# Patient Record
Sex: FEMALE | Race: WHITE | NOT HISPANIC OR LATINO | Employment: FULL TIME | URBAN - METROPOLITAN AREA
[De-identification: names, ages, dates, MRNs, and addresses within clinical notes are randomized per-mention and may not be internally consistent; named-entity substitution may affect disease eponyms.]

---

## 2016-12-30 RX ORDER — CELECOXIB 100 MG/1
100 CAPSULE ORAL 2 TIMES DAILY PRN
COMMUNITY
End: 2018-03-29 | Stop reason: CLARIF

## 2016-12-30 RX ORDER — MULTIVITAMIN
1 TABLET ORAL DAILY
COMMUNITY

## 2017-01-10 ENCOUNTER — ANESTHESIA (OUTPATIENT)
Dept: PERIOP | Facility: HOSPITAL | Age: 57
End: 2017-01-10
Payer: COMMERCIAL

## 2017-01-10 ENCOUNTER — ANESTHESIA EVENT (OUTPATIENT)
Dept: PERIOP | Facility: HOSPITAL | Age: 57
End: 2017-01-10
Payer: COMMERCIAL

## 2017-01-10 ENCOUNTER — HOSPITAL ENCOUNTER (OUTPATIENT)
Facility: HOSPITAL | Age: 57
Setting detail: OUTPATIENT SURGERY
Discharge: HOME | End: 2017-01-10
Attending: COLON & RECTAL SURGERY | Admitting: COLON & RECTAL SURGERY
Payer: COMMERCIAL

## 2017-01-10 VITALS
RESPIRATION RATE: 20 BRPM | BODY MASS INDEX: 22.2 KG/M2 | HEART RATE: 78 BPM | TEMPERATURE: 100 F | WEIGHT: 130 LBS | SYSTOLIC BLOOD PRESSURE: 129 MMHG | DIASTOLIC BLOOD PRESSURE: 70 MMHG | HEIGHT: 64 IN | OXYGEN SATURATION: 100 %

## 2017-01-10 DIAGNOSIS — K64.8 INTERNAL AND EXTERNAL HEMORRHOIDS WITHOUT COMPLICATION: ICD-10-CM

## 2017-01-10 DIAGNOSIS — K64.4 INTERNAL AND EXTERNAL HEMORRHOIDS WITHOUT COMPLICATION: ICD-10-CM

## 2017-01-10 PROCEDURE — 88304 TISSUE EXAM BY PATHOLOGIST: CPT | Performed by: COLON & RECTAL SURGERY

## 2017-01-10 RX ORDER — FENTANYL CITRATE 50 UG/ML
INJECTION, SOLUTION INTRAMUSCULAR; INTRAVENOUS AS NEEDED
Status: DISCONTINUED | OUTPATIENT
Start: 2017-01-10 | End: 2017-01-10 | Stop reason: SURG

## 2017-01-10 RX ORDER — OXYCODONE HYDROCHLORIDE AND ACETAMINOPHEN 5; 325 MG/1; MG/1
TABLET ORAL
Status: COMPLETED
Start: 2017-01-10 | End: 2017-01-10

## 2017-01-10 RX ORDER — PROPOFOL 10 MG/ML
INJECTION, EMULSION INTRAVENOUS CONTINUOUS PRN
Status: DISCONTINUED | OUTPATIENT
Start: 2017-01-10 | End: 2017-01-10 | Stop reason: SURG

## 2017-01-10 RX ORDER — FENTANYL CITRATE/PF 50 MCG/ML
25 SYRINGE (ML) INJECTION
Status: DISCONTINUED | OUTPATIENT
Start: 2017-01-10 | End: 2017-01-10 | Stop reason: HOSPADM

## 2017-01-10 RX ORDER — BUPIVACAINE HYDROCHLORIDE AND EPINEPHRINE 2.5; 5 MG/ML; UG/ML
INJECTION, SOLUTION EPIDURAL; INFILTRATION; INTRACAUDAL; PERINEURAL AS NEEDED
Status: DISCONTINUED | OUTPATIENT
Start: 2017-01-10 | End: 2017-01-10 | Stop reason: HOSPADM

## 2017-01-10 RX ORDER — SODIUM CHLORIDE, SODIUM LACTATE, POTASSIUM CHLORIDE, CALCIUM CHLORIDE 600; 310; 30; 20 MG/100ML; MG/100ML; MG/100ML; MG/100ML
20 INJECTION, SOLUTION INTRAVENOUS CONTINUOUS
Status: CANCELLED | OUTPATIENT
Start: 2017-01-10

## 2017-01-10 RX ORDER — PROPOFOL 10 MG/ML
INJECTION, EMULSION INTRAVENOUS AS NEEDED
Status: DISCONTINUED | OUTPATIENT
Start: 2017-01-10 | End: 2017-01-10 | Stop reason: SURG

## 2017-01-10 RX ORDER — ONDANSETRON 2 MG/ML
INJECTION INTRAMUSCULAR; INTRAVENOUS AS NEEDED
Status: DISCONTINUED | OUTPATIENT
Start: 2017-01-10 | End: 2017-01-10 | Stop reason: SURG

## 2017-01-10 RX ORDER — SODIUM CHLORIDE, SODIUM LACTATE, POTASSIUM CHLORIDE, CALCIUM CHLORIDE 600; 310; 30; 20 MG/100ML; MG/100ML; MG/100ML; MG/100ML
INJECTION, SOLUTION INTRAVENOUS CONTINUOUS PRN
Status: DISCONTINUED | OUTPATIENT
Start: 2017-01-10 | End: 2017-01-10 | Stop reason: SURG

## 2017-01-10 RX ORDER — MIDAZOLAM HYDROCHLORIDE 1 MG/ML
INJECTION INTRAMUSCULAR; INTRAVENOUS AS NEEDED
Status: DISCONTINUED | OUTPATIENT
Start: 2017-01-10 | End: 2017-01-10 | Stop reason: SURG

## 2017-01-10 RX ORDER — SODIUM CHLORIDE, SODIUM LACTATE, POTASSIUM CHLORIDE, CALCIUM CHLORIDE 600; 310; 30; 20 MG/100ML; MG/100ML; MG/100ML; MG/100ML
75 INJECTION, SOLUTION INTRAVENOUS CONTINUOUS
Status: DISCONTINUED | OUTPATIENT
Start: 2017-01-10 | End: 2017-01-10 | Stop reason: HOSPADM

## 2017-01-10 RX ORDER — OXYCODONE HYDROCHLORIDE AND ACETAMINOPHEN 5; 325 MG/1; MG/1
1 TABLET ORAL EVERY 4 HOURS PRN
Status: CANCELLED | OUTPATIENT
Start: 2017-01-10

## 2017-01-10 RX ORDER — ONDANSETRON 2 MG/ML
4 INJECTION INTRAMUSCULAR; INTRAVENOUS ONCE AS NEEDED
Status: DISCONTINUED | OUTPATIENT
Start: 2017-01-10 | End: 2017-01-10 | Stop reason: HOSPADM

## 2017-01-10 RX ORDER — OXYCODONE HYDROCHLORIDE AND ACETAMINOPHEN 5; 325 MG/1; MG/1
1 TABLET ORAL EVERY 4 HOURS PRN
Status: DISCONTINUED | OUTPATIENT
Start: 2017-01-10 | End: 2017-01-10 | Stop reason: HOSPADM

## 2017-01-10 RX ADMIN — OXYCODONE HYDROCHLORIDE AND ACETAMINOPHEN 1 TABLET: 5; 325 TABLET ORAL at 11:02

## 2017-01-10 RX ADMIN — PROPOFOL 30 MG: 10 INJECTION, EMULSION INTRAVENOUS at 08:06

## 2017-01-10 RX ADMIN — ONDANSETRON 4 MG: 2 INJECTION INTRAMUSCULAR; INTRAVENOUS at 08:30

## 2017-01-10 RX ADMIN — SODIUM CHLORIDE, SODIUM LACTATE, POTASSIUM CHLORIDE, AND CALCIUM CHLORIDE: .6; .31; .03; .02 INJECTION, SOLUTION INTRAVENOUS at 07:48

## 2017-01-10 RX ADMIN — FENTANYL CITRATE 25 MCG: 50 INJECTION, SOLUTION INTRAMUSCULAR; INTRAVENOUS at 08:10

## 2017-01-10 RX ADMIN — MIDAZOLAM HYDROCHLORIDE 2 MG: 1 INJECTION, SOLUTION INTRAMUSCULAR; INTRAVENOUS at 08:03

## 2017-01-10 RX ADMIN — BISACODYL 10 MG: 5 TABLET, COATED ORAL at 11:41

## 2017-01-10 RX ADMIN — FENTANYL CITRATE 25 MCG: 50 INJECTION, SOLUTION INTRAMUSCULAR; INTRAVENOUS at 08:06

## 2017-01-10 RX ADMIN — PROPOFOL 100 MCG/KG/MIN: 10 INJECTION, EMULSION INTRAVENOUS at 08:06

## 2017-06-08 ENCOUNTER — TRANSCRIBE ORDERS (OUTPATIENT)
Dept: ADMINISTRATIVE | Facility: HOSPITAL | Age: 57
End: 2017-06-08

## 2017-06-08 DIAGNOSIS — Z12.39 SCREENING BREAST EXAMINATION: Primary | ICD-10-CM

## 2017-06-14 DIAGNOSIS — Z12.31 ENCOUNTER FOR SCREENING MAMMOGRAM FOR MALIGNANT NEOPLASM OF BREAST: ICD-10-CM

## 2017-06-15 ENCOUNTER — ALLSCRIPTS OFFICE VISIT (OUTPATIENT)
Dept: OTHER | Facility: OTHER | Age: 57
End: 2017-06-15

## 2017-06-19 ENCOUNTER — HOSPITAL ENCOUNTER (OUTPATIENT)
Dept: MAMMOGRAPHY | Facility: CLINIC | Age: 57
Discharge: HOME | End: 2017-06-19
Payer: COMMERCIAL

## 2017-06-19 DIAGNOSIS — Z12.39 SCREENING BREAST EXAMINATION: ICD-10-CM

## 2017-06-19 PROCEDURE — G0202 SCR MAMMO BI INCL CAD: HCPCS

## 2017-06-22 LAB
ADDITIONAL INFORMATION (HISTORICAL): NORMAL
ADEQUACY: (HISTORICAL): NORMAL
COMMENT (HISTORICAL): NORMAL
CYTOTECHNOLOGIST: (HISTORICAL): NORMAL
HPV MRNA E6/E7 (HISTORICAL): NOT DETECTED
INTERPRETATION (HISTORICAL): NORMAL
LMP (HISTORICAL): NORMAL
PREV. BX: (HISTORICAL): NORMAL
PREV. PAP (HISTORICAL): NORMAL
SOURCE (HISTORICAL): NORMAL

## 2017-07-06 ENCOUNTER — GENERIC CONVERSION - ENCOUNTER (OUTPATIENT)
Dept: OTHER | Facility: OTHER | Age: 57
End: 2017-07-06

## 2018-01-12 NOTE — RESULT NOTES
Verified Results  (Q) THINPREP TIS PAP AND HPV mRNA E6/E7 REFLEX HPV 16,18/45 [id number] 46:61SQ Chris Mcdonald     Test Name Result Flag Reference   CLINICAL INFORMATION:      None given   LMP:      NONE GIVEN   PREV  PAP:      NONE GIVEN   PREV  BX:      NONE GIVEN   SOURCE:      Cervix, Endocervix   STATEMENT OF ADEQUACY:      Satisfactory for evaluation  Endocervical/transformation zone component  present  Age and/or menstrual status not provided   INTERPRETATION/RESULT:      Negative for intraepithelial lesion or malignancy  COMMENT:      This Pap test has been evaluated with computer  assisted technology  CYTOTECHNOLOGIST:      SPS,CT(ASCP)  Ct screening location: [address] [place] [address]   HPV mRNA E6/E7 Not Detected  Not Detected   This test was performed using the APTIMA HPV Assay ([employer] )  This assay detects E6/E7 viral messenger RNA (mRNA) from 14  high-risk HPV types (16,18,31,33,35,39,45,51,52,56,58,59,66,68)

## 2018-01-14 VITALS
DIASTOLIC BLOOD PRESSURE: 78 MMHG | WEIGHT: 132 LBS | SYSTOLIC BLOOD PRESSURE: 128 MMHG | BODY MASS INDEX: 22.53 KG/M2 | HEIGHT: 64 IN

## 2018-03-29 ENCOUNTER — CONSULT (OUTPATIENT)
Dept: VASCULAR SURGERY | Facility: CLINIC | Age: 58
End: 2018-03-29
Payer: COMMERCIAL

## 2018-03-29 VITALS
DIASTOLIC BLOOD PRESSURE: 62 MMHG | RESPIRATION RATE: 16 BRPM | HEIGHT: 64 IN | SYSTOLIC BLOOD PRESSURE: 132 MMHG | WEIGHT: 132 LBS | TEMPERATURE: 98.6 F | BODY MASS INDEX: 22.53 KG/M2 | HEART RATE: 76 BPM

## 2018-03-29 DIAGNOSIS — I78.1 ASYMPTOMATIC SPIDER VEINS OF BOTH LOWER EXTREMITIES: Primary | ICD-10-CM

## 2018-03-29 PROCEDURE — 99244 OFF/OP CNSLTJ NEW/EST MOD 40: CPT | Performed by: NURSE PRACTITIONER

## 2018-03-29 RX ORDER — MOMETASONE FUROATE 50 UG/1
2 SPRAY, METERED NASAL DAILY
COMMUNITY
End: 2022-01-17

## 2018-03-29 NOTE — PROGRESS NOTES
Assessment/Plan:    55 y/o healthy appearing F with MVP and hemorrhoids, seen for evaluation of varicose veins  She has bilateral lower extremity spider veins  She is largely asymptomatic only with mild swelling described as "sock marks" and intermittent heaviness  We discussed possible element of venous insufficiency contributing to her symptoms  She would like to pursue injection sclerotherapy for cosmetic treatment of her veins  We discussed possible side effects to include permanent discoloration and possibility of secondary varicosities  Price is $500 per session; she may require more than 1 session  Patient was seen and examined with Dr Mary Tatum  Diagnoses and all orders for this visit:    Asymptomatic spider veins of both lower extremities  -     Compression Stocking  -     Sclerotherapy; Future    Other orders  -     mometasone (NASONEX) 50 mcg/act nasal spray; 2 sprays into each nostril daily         Patient ID: Belkys Funez is a 56 y o  female  Chief Complaint: Pt is here to eval Vv/spider veins to bilateral legs she has had since before pregnancy in her early 19's  Pt has HX of Vv/ spider veins  This patient is seen for evaluation of varicose veins  She has bilateral lower extremity spider veins  States that veins have been present since her 25s  She is largely asymptomatic, but does experience "sock marks" at the end of the day  No gross swelling or skin changes  She states that sometimes her legs feel heavy; this is improved with elevation  She would like to pursue cosmetic treatment of her veins  The following portions of the patient's history were reviewed and updated as appropriate: allergies, current medications, past family history, past medical history, past social history, past surgical history and problem list     Review of Systems   Constitutional: Negative  HENT: Negative  Eyes: Negative  Respiratory: Negative  Cardiovascular: Negative  Gastrointestinal: Negative  Endocrine: Negative  Genitourinary: Negative  Musculoskeletal: Negative  Skin: Negative  Allergic/Immunologic: Positive for environmental allergies  Neurological: Negative  Hematological: Negative  Psychiatric/Behavioral: Negative  Objective:     Physical Exam   Constitutional: She is oriented to person, place, and time  No distress  Cardiovascular:   Pulses:       Dorsalis pedis pulses are 2+ on the right side, and 2+ on the left side  Posterior tibial pulses are 2+ on the right side, and 2+ on the left side  Spider veins bilateral lower extremities, ankles and thighs   Pulmonary/Chest: Effort normal  No respiratory distress  Musculoskeletal: Normal range of motion  She exhibits no edema  Neurological: She is alert and oriented to person, place, and time  Skin: Skin is warm and dry  Psychiatric: She has a normal mood and affect  Vitals:    03/29/18 1555   BP: 132/62   BP Location: Left arm   Patient Position: Sitting   Cuff Size: Adult   Pulse: 76   Resp: 16   Temp: 98 6 °F (37 °C)   TempSrc: Tympanic   Weight: 59 9 kg (132 lb)   Height: 5' 4" (1 626 m)       There is no problem list on file for this patient  Past Surgical History:   Procedure Laterality Date    CARDIAC CATHETERIZATION      COLONOSCOPY      GANGLION CYST EXCISION Right     wrist    OOPHORECTOMY Right     [place] HEMORRHOIDECTOMY,INT/EXT, 2+ COLUMNS/GROUPS N/A 1/10/2017    Procedure: INTERNAL AND EXTERNAL HEMORRHOIDECTOMY ;  Surgeon: Olivia Love MD;  [facility]: [facility];  Service: Colorectal       History reviewed  No pertinent family history  Social History     Social History    Marital status: /Civil Union     Spouse name: N/A    Number of children: N/A    Years of education: N/A     Occupational History    Not on file       Social History Main Topics    Smoking status: Never Smoker    Smokeless tobacco: Never Used    Alcohol use Yes      Comment: social use    Drug use: No    Sexual activity: Not on file     Other Topics Concern    Not on file     Social History Narrative    No narrative on file       No Known Allergies      Current Outpatient Prescriptions:     mometasone (NASONEX) 50 mcg/act nasal spray, 2 sprays into each nostril daily, Disp: , Rfl:     Multiple Vitamin (MULTIVITAMIN) tablet, Take 1 tablet by mouth daily, Disp: , Rfl:     Multiple Vitamins-Minerals (HAIR/SKIN/NAILS) TABS, Take by mouth daily, Disp: , Rfl:

## 2018-03-29 NOTE — PATIENT INSTRUCTIONS
Spider veins  -You will have injection sclerotherapy by Dr Hermilo Singletary for cosmetic treatment of your varicose veins  -Bring knee high compression stockings with you to your appointment (Rx given)  -Possible side effects of injections include permanent discoloration    There is also the possibility of recurrent varicose/spider veins  -Wearing knee high compression stockings on a daily basis will help manage your swelling and intermittent heaviness

## 2018-05-16 ENCOUNTER — CLINICAL SUPPORT (OUTPATIENT)
Dept: VASCULAR SURGERY | Facility: CLINIC | Age: 58
End: 2018-05-16

## 2018-05-16 DIAGNOSIS — I83.93 SPIDER VEINS OF BOTH LOWER EXTREMITIES: Primary | ICD-10-CM

## 2018-05-16 PROCEDURE — 36468 NJX SCLRSNT SPIDER VEINS: CPT | Performed by: SURGERY

## 2018-05-16 NOTE — PATIENT INSTRUCTIONS
1) Spider vein injections  -please keep your bandages on for the rest of the day and overnight    You can remove then tomorrow and take a shower  -you have no activity restrictions  -you should expect a lot of redness initially that will fade with time  -if you have any signs of infection (fevers, chills, drainage from injection sites, significant pain), please call the office  -I will see you back in 6 wks to look at the results and decide if any additional treatments are needed  -please call the office if you have any questions

## 2018-05-16 NOTE — PROGRESS NOTES
Assessment/Plan:    Pt is a 55 yo F, otherwise healthy, who presents for spider vein injections    Spider veins of both lower extremities  -discussed risks and benefits of procedure; all questions answered  -s/p asclera injections of B legs, 8cc 0 5% solution used in total   No complications  Tolerated very well  -f/u in 6 wks for followup; may require touchup injections    Subjective:      Patient ID: Lauren Frost is a 56 y o  female  HPI:    Presents for first spider vein injections  No prior injection procedures  No DVT  No edema, no varicose veins  The following portions of the patient's history were reviewed and updated as appropriate: allergies, current medications, past family history, past medical history, past social history, past surgical history and problem list     Review of Systems   Constitutional: Negative for chills and fever  Cardiovascular: Negative for leg swelling  Skin: Negative for rash and wound  Objective: There were no vitals taken for this visit  Physical Exam   Skin:   Very small spider veins diffusely over legs, focused on R anterior thigh, B lateral and medial thighs, R medial leg, L medial ankle; B posterior prox thighs, R posterior knee                     There were no vitals filed for this visit  Patient Active Problem List   Diagnosis    Spider veins of both lower extremities       Past Surgical History:   Procedure Laterality Date    CARDIAC CATHETERIZATION      COLONOSCOPY      GANGLION CYST EXCISION Right     wrist    OOPHORECTOMY Right     [place] HEMORRHOIDECTOMY,INT/EXT, 2+ COLUMNS/GROUPS N/A 1/10/2017    Procedure: INTERNAL AND EXTERNAL HEMORRHOIDECTOMY ;  Surgeon: Michael Pearce MD;  [facility];  Service: Colorectal       No family history on file      Social History     Social History    Marital status: /Civil Union     Spouse name: N/A    Number of children: N/A    Years of education: N/A Occupational History    Not on file       Social History Main Topics    Smoking status: Never Smoker    Smokeless tobacco: Never Used    Alcohol use Yes      Comment: social use    Drug use: No    Sexual activity: Not on file     Other Topics Concern    Not on file     Social History Narrative    No narrative on file       No Known Allergies      Current Outpatient Prescriptions:     mometasone (NASONEX) 50 mcg/act nasal spray, 2 sprays into each nostril daily, Disp: , Rfl:     Multiple Vitamin (MULTIVITAMIN) tablet, Take 1 tablet by mouth daily, Disp: , Rfl:     Multiple Vitamins-Minerals (HAIR/SKIN/NAILS) TABS, Take by mouth daily, Disp: , Rfl:

## 2018-07-11 ENCOUNTER — OFFICE VISIT (OUTPATIENT)
Dept: VASCULAR SURGERY | Facility: CLINIC | Age: 58
End: 2018-07-11

## 2018-07-11 VITALS
TEMPERATURE: 98.8 F | RESPIRATION RATE: 16 BRPM | HEIGHT: 64 IN | BODY MASS INDEX: 23.39 KG/M2 | HEART RATE: 78 BPM | WEIGHT: 137 LBS | DIASTOLIC BLOOD PRESSURE: 78 MMHG | SYSTOLIC BLOOD PRESSURE: 118 MMHG

## 2018-07-11 DIAGNOSIS — I83.93 SPIDER VEINS OF BOTH LOWER EXTREMITIES: Primary | ICD-10-CM

## 2018-07-11 PROCEDURE — 99024 POSTOP FOLLOW-UP VISIT: CPT | Performed by: SURGERY

## 2018-07-11 NOTE — PROGRESS NOTES
Assessment/Plan:    Pt is a 57 yo F, otherwise healthy, who presents for recheck after spider vein injections     Spider veins of both lower extremities  -s/p asclera injections of B legs, 8cc 0 5% solution used in total   No complications  Tolerated very well  -excellent results  -one small area anterior R ankle that could use reinjection if desired  -f/u PRN; if wants touchup session, can book without another office visit; 1 vial, 45min appt, free (only 4 vials used for initial treatment)    Subjective:      Patient ID: Milad Snow is a 56 y o  female  Patient is seen for f/u sclerosing injection in the bilat legs  She is overall happy with the appearance since the injections  She has a few areas she is not sure if need to be repeated  HPI:     Presents for followup after spider vein injections      No prior injection procedures  No DVT  No edema, no varicose veins  No issues after injection  Excellent results        The following portions of the patient's history were reviewed and updated as appropriate: allergies, current medications, past family history, past medical history, past social history, past surgical history and problem list     Review of Systems   Constitutional: Negative  HENT: Negative  Eyes: Negative  Respiratory: Negative  Cardiovascular: Negative  Gastrointestinal: Negative  Endocrine: Negative  Genitourinary: Negative  Musculoskeletal: Negative  Skin: Negative  Allergic/Immunologic: Negative  Neurological: Negative  Hematological: Negative  Psychiatric/Behavioral: Negative            Objective:      /78 (BP Location: Left arm, Patient Position: Sitting, Cuff Size: Adult)   Pulse 78   Temp 98 8 °F (37 1 °C) (Tympanic)   Resp 16   Ht 5' 4" (1 626 m)   Wt 62 1 kg (137 lb)   BMI 23 52 kg/m²          Physical Exam   Skin:   Spider veins mostly successfully treated; one area L ankle with thrombus in vein; one area R anterior ankle still patent; otherwise excellent result; no discoloration, no edema, no ulteration         Vitals:    07/11/18 1253   BP: 118/78   BP Location: Left arm   Patient Position: Sitting   Cuff Size: Adult   Pulse: 78   Resp: 16   Temp: 98 8 °F (37 1 °C)   TempSrc: Tympanic   Weight: 62 1 kg (137 lb)   Height: 5' 4" (1 626 m)       Patient Active Problem List   Diagnosis    Spider veins of both lower extremities       Past Surgical History:   Procedure Laterality Date    CARDIAC CATHETERIZATION      COLONOSCOPY      GANGLION CYST EXCISION Right     wrist    OOPHORECTOMY Right     [place] HEMORRHOIDECTOMY,INT/EXT, 2+ COLUMNS/GROUPS N/A 1/10/2017    Procedure: INTERNAL AND EXTERNAL HEMORRHOIDECTOMY ;  Surgeon: Baldemar Dickens MD;  [facility];  Service: Colorectal       Family History   Problem Relation Age of Onset    Melanoma Mother     Colon cancer Father     Pulmonary fibrosis Father     Stroke Daughter     Cancer Son        Social History     Social History    Marital status: /Civil Union     Spouse name: N/A    Number of children: N/A    Years of education: N/A     Occupational History    Not on file       Social History Main Topics    Smoking status: Never Smoker    Smokeless tobacco: Never Used    Alcohol use Yes      Comment: social use    Drug use: No    Sexual activity: Not on file     Other Topics Concern    Not on file     Social History Narrative    No narrative on file       No Known Allergies      Current Outpatient Prescriptions:     mometasone (NASONEX) 50 mcg/act nasal spray, 2 sprays into each nostril daily, Disp: , Rfl:     Multiple Vitamin (MULTIVITAMIN) tablet, Take 1 tablet by mouth daily, Disp: , Rfl:     Multiple Vitamins-Minerals (HAIR/SKIN/NAILS) TABS, Take by mouth daily, Disp: , Rfl:

## 2018-07-26 ENCOUNTER — TELEPHONE (OUTPATIENT)
Dept: GYNECOLOGY | Facility: CLINIC | Age: 58
End: 2018-07-26

## 2018-07-26 DIAGNOSIS — Z12.31 BREAST CANCER SCREENING BY MAMMOGRAM: Primary | ICD-10-CM

## 2018-07-27 ENCOUNTER — APPOINTMENT (OUTPATIENT)
Dept: RADIOLOGY | Facility: CLINIC | Age: 58
End: 2018-07-27
Payer: COMMERCIAL

## 2018-07-27 ENCOUNTER — OFFICE VISIT (OUTPATIENT)
Dept: OBGYN CLINIC | Facility: CLINIC | Age: 58
End: 2018-07-27
Payer: COMMERCIAL

## 2018-07-27 VITALS
WEIGHT: 135 LBS | BODY MASS INDEX: 23.05 KG/M2 | HEART RATE: 72 BPM | DIASTOLIC BLOOD PRESSURE: 72 MMHG | SYSTOLIC BLOOD PRESSURE: 112 MMHG | HEIGHT: 64 IN

## 2018-07-27 DIAGNOSIS — M25.552 PAIN IN LEFT HIP: ICD-10-CM

## 2018-07-27 DIAGNOSIS — S76.012A STRAIN OF LEFT HIP, INITIAL ENCOUNTER: Primary | ICD-10-CM

## 2018-07-27 PROCEDURE — 99203 OFFICE O/P NEW LOW 30 MIN: CPT | Performed by: ORTHOPAEDIC SURGERY

## 2018-07-27 PROCEDURE — 73502 X-RAY EXAM HIP UNI 2-3 VIEWS: CPT

## 2018-07-27 RX ORDER — LEVOCETIRIZINE DIHYDROCHLORIDE 5 MG/1
5 TABLET, FILM COATED ORAL EVERY EVENING
COMMUNITY
End: 2022-01-17

## 2018-07-27 NOTE — ASSESSMENT & PLAN NOTE
Findings consistent with left hip strain  Discussed findings and treatment options with the patient  I reviewed patient's left hip x-ray with her  Discussed prognosis of her left hip condition  I recommended patient to attend physical therapy for hip rehabilitation  I think patient may have strained her hip from her running  I will see patient back in 6-8 weeks for re-evaluation  All patient's questions were answered to her satisfaction  This note is created using dictation transcription  It may contain typographical errors, grammatical errors, improperly dictated words, background noise and other errors

## 2018-07-27 NOTE — PROGRESS NOTES
Assessment:     1  Strain of left hip, initial encounter          Plan:     Problem List Items Addressed This Visit        Musculoskeletal and Integument    Strain of left hip - Primary     Findings consistent with left hip strain  Discussed findings and treatment options with the patient  I reviewed patient's left hip x-ray with her  Discussed prognosis of her left hip condition  I recommended patient to attend physical therapy for hip rehabilitation  I think patient may have strained her hip from her running  I will see patient back in 6-8 weeks for re-evaluation  All patient's questions were answered to her satisfaction  This note is created using dictation transcription  It may contain typographical errors, grammatical errors, improperly dictated words, background noise and other errors  Relevant Orders    XR hip/pelv 2-3 vws left if performed    Ambulatory referral to Physical Therapy           Patient ID: Sea Moore is a 56 y o  female  Chief Complaint:  Left hip pain    HPI:  Patient is here for left hip pain  Patient states 1-2 months ago she started running and developed left hip pain a couple weeks in  Patient notes she is having left hip ache radiating in to the groin and down the left lateral thigh at times  Denies any numbness or tingling  Patient notes her pain is worse when going up steps, or getting in and out of a car  She also states some tenderness around the Left SI joint area  Pain is better when resting  Denies radiating pain down to her left leg  No weakness  Information on patient's intake form was reviewed      Allergy:  No Known Allergies    Medications:  all current active meds have been reviewed    Past Medical History:  Past Medical History:   Diagnosis Date    Hemorrhoids     MVP (mitral valve prolapse)        Past Surgical History:  Past Surgical History:   Procedure Laterality Date    CARDIAC CATHETERIZATION      COLONOSCOPY      GANGLION CYST EXCISION Right     wrist    OOPHORECTOMY Right     [place] HEMORRHOIDECTOMY,INT/EXT, 2+ COLUMNS/GROUPS N/A 1/10/2017    Procedure: INTERNAL AND EXTERNAL HEMORRHOIDECTOMY ;  Surgeon: Monika Campo MD;  [facility];  Service: Colorectal       Family History:  Family History   Problem Relation Age of Onset    Melanoma Mother     Colon cancer Father     Pulmonary fibrosis Father     Stroke Daughter     Cancer Son        Social History:  History   Alcohol Use    Yes     Comment: social use     History   Drug Use No     History   Smoking Status    Never Smoker   Smokeless Tobacco    Never Used           ROS:  Review of Systems   Constitutional: Negative  HENT: Negative  Eyes: Negative  Respiratory: Negative  Cardiovascular: Negative  Gastrointestinal: Negative  Endocrine: Negative  Musculoskeletal: Positive for arthralgias (Left hip) and back pain (Left buttock)  Negative for joint swelling  Skin: Negative  Neurological: Negative  Psychiatric/Behavioral: Negative  Objective:  BP Readings from Last 1 Encounters:   07/27/18 112/72      Wt Readings from Last 1 Encounters:   07/27/18 61 2 kg (135 lb)        BMI:   Estimated body mass index is 23 17 kg/m² as calculated from the following:    Height as of this encounter: 5' 4" (1 626 m)  Weight as of this encounter: 61 2 kg (135 lb)  EXAM:   Physical Exam   Constitutional: She is oriented to person, place, and time  She appears well-developed and well-nourished  HENT:   Head: Normocephalic and atraumatic  Right Ear: External ear normal    Left Ear: External ear normal    Eyes: Conjunctivae are normal    Neck: Neck supple  Neurological: She is alert and oriented to person, place, and time  Skin: Skin is warm and dry  Psychiatric: She has a normal mood and affect  Her behavior is normal  Thought content normal    Nursing note and vitals reviewed      Right Hip Exam     Tenderness   The patient is experiencing no tenderness  Range of Motion   The patient has normal right hip ROM  Muscle Strength   The patient has normal right hip strength  Tests   ISAAC: negative  Ketan: negative    Other   Erythema: absent  Sensation: normal  Pulse: present    Comments:  Negative straight a raise sign  No tenderness over the SI joint  Left Hip Exam     Other   Erythema: absent  Sensation: normal  Pulse: present              Radiographs:  I have personally reviewed pertinent films in PACS

## 2018-07-29 DIAGNOSIS — M70.70 BURSITIS OF HIP, UNSPECIFIED BURSA, UNSPECIFIED LATERALITY: Primary | ICD-10-CM

## 2018-07-29 RX ORDER — CELECOXIB 200 MG/1
200 CAPSULE ORAL 2 TIMES DAILY
Qty: 60 CAPSULE | Refills: 1 | Status: SHIPPED | OUTPATIENT
Start: 2018-07-29 | End: 2018-09-12 | Stop reason: HOSPADM

## 2018-08-01 ENCOUNTER — ANNUAL EXAM (OUTPATIENT)
Dept: GYNECOLOGY | Facility: CLINIC | Age: 58
End: 2018-08-01
Payer: COMMERCIAL

## 2018-08-01 VITALS
HEIGHT: 63 IN | WEIGHT: 137 LBS | DIASTOLIC BLOOD PRESSURE: 84 MMHG | SYSTOLIC BLOOD PRESSURE: 122 MMHG | BODY MASS INDEX: 24.27 KG/M2

## 2018-08-01 DIAGNOSIS — Z01.419 ENCOUNTER FOR ANNUAL ROUTINE GYNECOLOGICAL EXAMINATION: Primary | ICD-10-CM

## 2018-08-01 DIAGNOSIS — N95.2 VAGINAL ATROPHY: ICD-10-CM

## 2018-08-01 PROCEDURE — 99396 PREV VISIT EST AGE 40-64: CPT | Performed by: NURSE PRACTITIONER

## 2018-08-01 RX ORDER — ESTRADIOL 0.1 MG/G
0.5 CREAM VAGINAL 2 TIMES WEEKLY
Qty: 42.5 G | Refills: 4 | Status: SHIPPED | OUTPATIENT
Start: 2018-08-02 | End: 2019-08-19 | Stop reason: SDUPTHER

## 2018-08-01 NOTE — PROGRESS NOTES
Derik Melton is a 56 y o  female who presents for annual exam  The patient is post menopausal She relates that she has not been using the estrogen cream and does have some vaginal dryness ryne  with intercourse  The patient is sexually active  GYN screening history: last pap: was normal and last mammogram: was normal  The patient is not taking hormone replacement therapy  Patient denies post-menopausal vaginal bleeding      The patient participates in regular exercise: yes  History of abnormal Pap smear: no  Family history of breast cancer: yes - sister dx  at age 61  Past Medical History:   Diagnosis Date    Hemorrhoids     MVP (mitral valve prolapse)      Family History   Problem Relation Age of Onset    Melanoma Mother     Colon cancer Father     Pulmonary fibrosis Father     Stroke Daughter    Elder Hero Cancer Son     Breast cancer Sister      Past Surgical History:   Procedure Laterality Date    CARDIAC CATHETERIZATION      COLONOSCOPY      GANGLION CYST EXCISION Right     wrist    OOPHORECTOMY Right     [place] HEMORRHOIDECTOMY,INT/EXT, 2+ COLUMNS/GROUPS N/A 1/10/2017    Procedure: INTERNAL AND EXTERNAL HEMORRHOIDECTOMY ;  Surgeon: Zack Matamoros MD;  [facility];  Service: Colorectal     Social History     Social History    Marital status: /Civil Union     Spouse name: N/A    Number of children: N/A    Years of education: N/A     Occupational History    Not on file       Social History Main Topics    Smoking status: Never Smoker    Smokeless tobacco: Never Used    Alcohol use Yes      Comment: social use    Drug use: No    Sexual activity: Yes     Partners: Male     Birth control/ protection: Post-menopausal     Other Topics Concern    Not on file     Social History Narrative    No narrative on file       Current Outpatient Prescriptions:     celecoxib (CeleBREX) 200 mg capsule, Take 1 capsule (200 mg total) by mouth 2 (two) times a day, Disp: 60 capsule, Rfl: 1    [START ON 8/2/2018] estradiol (ESTRACE) 0 1 mg/g vaginal cream, Insert 0 5 g into the vagina 2 (two) times a week, Disp: 42 5 g, Rfl: 4    levocetirizine (XYZAL) 5 MG tablet, Take 5 mg by mouth every evening, Disp: , Rfl:     mometasone (NASONEX) 50 mcg/act nasal spray, 2 sprays into each nostril daily, Disp: , Rfl:     Multiple Vitamin (MULTIVITAMIN) tablet, Take 1 tablet by mouth daily, Disp: , Rfl:     Multiple Vitamins-Minerals (HAIR/SKIN/NAILS) TABS, Take by mouth daily, Disp: , Rfl:       Review of Systems  Constitutional :no fever, feels well, no tiredness, no recent weight gain or loss  Cardiovascular: no complaints of slow or fast heart beat, no chest pain, no palpitations  Respiratory: no complaints of shortness of shortness of breath, no PETIT  Breasts:no complaints of breast pain, breast lump, or nipple discharge  Gastrointestinal: no complaints of abdominal pain, constipation,nausea, vomiting, or diarrhea or bloody stools  Genitourinary : no complaints of dysuria, incontinence, pelvic pain, dysmenorrhea,vaginal discharge or abnormal vaginal bleeding  Integumentary: no complaints of skin rash or lesion,itching or dry skin  Neurological: no complaints of headache,numbness, tingling, dizziness or fainting       Objective      /84 (BP Location: Right arm)   Ht 5' 3 25" (1 607 m)   Wt 62 1 kg (137 lb)   BMI 24 08 kg/m²     General:   appears stated age","cooperative","alert" normal mood and affect   Neck: Neck: normal, supple,trachea midline, no masses   Heart: regular rate and rhythm, S1, S2 normal, no murmur, click, rub or gallop   Lungs: clear to auscultation bilaterally   Breasts: Breast exam :normal, no dimpling or skin changes noted   Abdomen: soft, non-tender, without masses or organomegaly   Vulva: Normal , no lesions   Vagina: normal , no lesions   Mild atrophy   Urethra: normal   Cervix: Normal, no palpable masses A pap smear was not done   Uterus: Normal , non-tender,not enlarged,no palpable masses   Adnexa: Normal, non-tender without fullness or masses                         Assessment   Normal GYN exam  Vaginal atrophy       Plan      All questions answered  Breast self exam technique reviewed and patient encouraged to perform self-exam monthly  Dietary diary  Follow up as needed  Mammogram   We discussed her vaginal dryness/atrophy and the use of estrogn cream She will resume use at this time as directed and a prescription has been printed  The pt  is UTD with her osteoporosis screening  She will be due for a repeat colonoscopy in 2019

## 2018-08-02 ENCOUNTER — EVALUATION (OUTPATIENT)
Dept: PHYSICAL THERAPY | Facility: CLINIC | Age: 58
End: 2018-08-02
Payer: COMMERCIAL

## 2018-08-02 DIAGNOSIS — S76.012D STRAIN OF LEFT HIP, SUBSEQUENT ENCOUNTER: ICD-10-CM

## 2018-08-02 PROCEDURE — 97161 PT EVAL LOW COMPLEX 20 MIN: CPT | Performed by: PHYSICAL THERAPIST

## 2018-08-02 PROCEDURE — G8978 MOBILITY CURRENT STATUS: HCPCS | Performed by: PHYSICAL THERAPIST

## 2018-08-02 PROCEDURE — G8979 MOBILITY GOAL STATUS: HCPCS | Performed by: PHYSICAL THERAPIST

## 2018-08-02 NOTE — PROGRESS NOTES
PT Evaluation     Today's date: 2018  Patient name: Thomas Meade  : 1960  MRN: [id number]  Referring provider: Shantell Schilling MD  Dx:   Encounter Diagnosis     ICD-10-CM    1  Strain of left hip, subsequent encounter S76 012D Ambulatory referral to Physical Therapy                  Assessment  Impairments: impaired physical strength, lacks appropriate home exercise program and pain with function    Assessment details: Pt is a 56y o  year old female coming to outpatient PT with a diagnosis of L hip strain  Pt presents with increased pain, no TTP, decreased L hip strength and overall decreased functional mobility  Pt would benefit from skilled PT services in order to address these deficits and reach maximum level of function  Thank you kindly for the referral!  Pt was issued a HEP today  Pt plans on attending therapy for 1-2 more visits to learn a comprehensive HEP for her L hip  Understanding of Dx/Px/POC: good   Prognosis: good    Goals  STG's ( 2-3 weeks)  1  Pt will be independent in HEP  2  Decrease pain to 0-1/10 with activity  LTG's ( 3-4 weeks)  1  Improve FOTO score by 8-10 points  2  Improve L hip strength to 5/5      Plan  Patient would benefit from: PT eval and skilled physical therapy  Planned therapy interventions: manual therapy, neuromuscular re-education, strengthening, stretching and therapeutic exercise  Frequency: 2x week  Duration in weeks: 4  Treatment plan discussed with: patient        Subjective Evaluation    History of Present Illness  Mechanism of injury: Pt reports she has been an active person and recently got involved with [employer]  2018, pt began to alternate running and walking on the treadmill  6 weeks later, her L knee started to ache, the pain went away, and then her L hip began to hurt and the pain did not resolve  Pt had increased pain when going up the steps, stepping up with the L LE and advancing L LE when walking   Pt feels relief with medication and resting her legs  Pt denies pain with sleeping activities  Pt has increased stiffness after sitting cross legged for a period of time  Pt notes some aching when standing for long time periods on the hard floor  Brief pain when standing up after in a deep squat for gardening activiteis    Work: school nurse  Hobbies: exercising, gardening, cooking  Gait: no abnormalities: good foot position  Pain  At best pain ratin  At worst pain ratin  Location: L hip   Quality: dull ache    Social Support  Steps to enter house: yes  4  Stairs in house: yes   13  Lives in: multiple-level home  Lives with: spouse    Employment status: working  Treatments  No previous or current treatments  Patient Goals  Patient goals for therapy: decreased pain  Patient goal: to be able to return to recreational exercise        Objective     Neurological Testing     Sensation     Lumbar   Left   Intact: light touch    Right   Intact: light touch    Reflexes   Left   Patellar (L4): normal (2+)  Achilles (S1): normal (2+)    Right   Patellar (L4): normal (2+)  Achilles (S1): normal (2+)    Active Range of Motion     Lumbar   Flexion: WFL  Extension: with pain  Left lateral flexion: WFL  Right lateral flexion: with pain  Left rotation: WFL  Right rotation: with pain  Left Hip   Flexion: [place] [place]  External rotation (90/90): 32 degrees with pain  Internal rotation (90/90): 32 degrees     Right Hip   Flexion: N[place]/[facility] [place]  External rotation (90/90): 30 degrees   Internal rotation (90/90): 32 degrees     Additional Active Range of Motion Details  In standing: symmetrical iliac crest and PSIS levels  (-) TTP lumbar spine  (-) supine to long sit test  HS flexibility; WFL's    Strength/Myotome Testing     Left Hip   Planes of Motion   Flexion: 4+  Extension: 5  Abduction: 5  Adduction: 4+  External rotation: 4+  Internal rotation: 4+ (pain)    Right Hip   Normal muscle strength    Additional Strength Details  Knee and ankle strength: WFL's  Deep squat with arms overhead: limited with increased trunk flexion, increased shoulder flexion  L SLS: 30 seconds  (-) scouring test  Mild discomfort with ISAAC  (-) piriformis; (-) SKTC  Step up FW 6" : no pain  (-) TTP L hip  (-) Ketan's test      Flowsheet Rows      Most Recent Value   PT/OT G-Codes   Current Score  66   Projected Score  79   FOTO information reviewed  Yes   Assessment Type  Evaluation   G code set  Mobility: Walking & Moving Around   Mobility: Walking and Moving Around Current Status ()  CJ   Mobility: Walking and Moving Around Goal Status ()  CJ        Dx: L hip strain  EPOC: 8/30/18  CO-MORBIDITIES: none  PERSONAL FACTORS: wants to return to recreational walking  Precautions: none    Daily Treatment Diary     Manual              L hip PROM             L hip inf glides             L hip lateral glides                                           Exercise Diary              bike             Mini squats w/ tb             Step ups FW 6"            Step ups lat  6"            Sidestepping w/ mini squat / tband             Leg press                          Bird dog             Crunches: R & O on pball             DLS: dead bug             SLR flexion             SLR w/ ER             1/2 kneeling chop and lift                                                                                                            Modalities

## 2018-08-04 ENCOUNTER — HOSPITAL ENCOUNTER (OUTPATIENT)
Dept: MAMMOGRAPHY | Facility: CLINIC | Age: 58
Discharge: HOME | End: 2018-08-04
Payer: COMMERCIAL

## 2018-08-04 DIAGNOSIS — Z12.31 BREAST CANCER SCREENING BY MAMMOGRAM: ICD-10-CM

## 2018-08-04 PROCEDURE — 77067 SCR MAMMO BI INCL CAD: CPT

## 2018-08-04 PROCEDURE — 77063 BREAST TOMOSYNTHESIS BI: CPT

## 2018-08-06 DIAGNOSIS — K04.7 DENTAL ABSCESS: Primary | ICD-10-CM

## 2018-08-06 RX ORDER — AMOXICILLIN 500 MG/1
500 CAPSULE ORAL EVERY 8 HOURS SCHEDULED
Qty: 30 CAPSULE | Refills: 0 | Status: SHIPPED | OUTPATIENT
Start: 2018-08-06 | End: 2018-08-16

## 2018-08-08 ENCOUNTER — EVALUATION (OUTPATIENT)
Dept: PHYSICAL THERAPY | Facility: CLINIC | Age: 58
End: 2018-08-08
Payer: COMMERCIAL

## 2018-08-08 DIAGNOSIS — S76.012D STRAIN OF LEFT HIP, SUBSEQUENT ENCOUNTER: Primary | ICD-10-CM

## 2018-08-08 PROCEDURE — G8980 MOBILITY D/C STATUS: HCPCS | Performed by: PHYSICAL THERAPIST

## 2018-08-08 PROCEDURE — 97140 MANUAL THERAPY 1/> REGIONS: CPT | Performed by: PHYSICAL THERAPIST

## 2018-08-08 PROCEDURE — 97110 THERAPEUTIC EXERCISES: CPT | Performed by: PHYSICAL THERAPIST

## 2018-08-08 PROCEDURE — 97112 NEUROMUSCULAR REEDUCATION: CPT | Performed by: PHYSICAL THERAPIST

## 2018-08-08 PROCEDURE — G8979 MOBILITY GOAL STATUS: HCPCS | Performed by: PHYSICAL THERAPIST

## 2018-08-08 NOTE — PROGRESS NOTES
Daily Note / D/c Note  18: Will d/c pt from skilled PT at this time  Pt attended IE and one follow up visit for HEP instruction  Pt was issued a comprehensive HEP  Today's date: 2018  Patient name: Juan Rothman  : 1960  MRN: [id number]  Referring provider: Freda Matos MD  Dx:   Encounter Diagnosis     ICD-10-CM    1  Strain of left hip, subsequent encounter S76 012D                   Subjective: Pt had some stiffness and achiness in her hip this morning  Pt was in her office at work with increased weight bearing and steps today  Objective: See treatment diary below      Assessment: Tolerated treatment well  Patient had some soreness wtih sidestepping with tband and step downs  Plan: Pt will continue with HEP  Comprehensive HEP and green tband issued   Pt may choose to continue with HEP only    Dx: L hip strain  EPOC: 18  CO-MORBIDITIES: none  PERSONAL FACTORS: wants to return to recreational walking  Precautions: none    Daily Treatment Diary     Manual              L hip PROM th            L hip inf glides th            L hip lateral glides th                          10'                Exercise Diary              bike 7'            Mini squats w/ tb 10 x10"            Step downs FW 6" x10            Step downs lat  6" x10            Sidestepping w/ mini squat / tband 1 laps            Leg press 55#                         Bird dog 10            Crunches: R & O on pball 10 ea            DLS: dead bug 10            SLR flexion             SLR w/ ER             1/2 kneeling chop and lift             Bridges w/ LE's on pall 10 x10"            DLS: march L2 10                                                                                 Modalities

## 2018-09-12 ENCOUNTER — OFFICE VISIT (OUTPATIENT)
Dept: OBGYN CLINIC | Facility: CLINIC | Age: 58
End: 2018-09-12
Payer: COMMERCIAL

## 2018-09-12 VITALS
DIASTOLIC BLOOD PRESSURE: 72 MMHG | HEIGHT: 64 IN | SYSTOLIC BLOOD PRESSURE: 116 MMHG | WEIGHT: 135 LBS | BODY MASS INDEX: 23.05 KG/M2

## 2018-09-12 DIAGNOSIS — S76.012D STRAIN OF LEFT HIP, SUBSEQUENT ENCOUNTER: Primary | ICD-10-CM

## 2018-09-12 PROCEDURE — 99213 OFFICE O/P EST LOW 20 MIN: CPT | Performed by: ORTHOPAEDIC SURGERY

## 2018-09-12 NOTE — PROGRESS NOTES
Assessment: Strain of left hip      1  Strain of left hip, subsequent encounter        Plan:  Continue HEP  Follow up as needed if symptoms fail to improve or worsen      Problem List Items Addressed This Visit     Strain of left hip - Primary         Subjective:     Patient ID: Joe Walker is a 66 y o  female  Chief Complaint:  66 y o  female presents to the office today for follow up for left hip strain  Overall the patient is doing very well  The patient states her symptoms are 98 percent improved  The patient attended physical therapy a couple times and then was transitioned to a HEP  The patient has been doing her home exercises  The patient notes mild discomfort to her right hip when she sits crossed legged and goes to stand up  The patient denies associated numbness or tingling  Allergy:  No Known Allergies  Medications:  all current active meds have been reviewed  Past Medical History:  Past Medical History:   Diagnosis Date    Hemorrhoids     MVP (mitral valve prolapse)      Past Surgical History:  Past Surgical History:   Procedure Laterality Date    CARDIAC CATHETERIZATION      COLONOSCOPY      GANGLION CYST EXCISION Right     wrist    OOPHORECTOMY Right     NE HEMORRHOIDECTOMY,INT/EXT, 2+ COLUMNS/GROUPS N/A 1/10/2017    Procedure: INTERNAL AND EXTERNAL HEMORRHOIDECTOMY ;  Surgeon: Yariel Hinds MD;  Location: [facility];  Service: Colorectal     Family History:  Family History   Problem Relation Age of Onset    Melanoma Mother     Colon cancer Father     Pulmonary fibrosis Father     Stroke Daughter     Cancer Son     Breast cancer Sister      Social History:  History   Alcohol Use    Yes     Comment: social use     History   Drug Use No     History   Smoking Status    Never Smoker   Smokeless Tobacco    Never Used     Review of Systems   Constitutional: Negative for chills, fever and unexpected weight change     HENT: Negative for hearing loss, nosebleeds and sore throat  Eyes: Negative for pain, redness and visual disturbance  Respiratory: Negative for cough, shortness of breath and wheezing  Cardiovascular: Negative for chest pain, palpitations and leg swelling  Gastrointestinal: Negative for abdominal pain, nausea and vomiting  Endocrine: Negative for polydipsia and polyuria  Genitourinary: Negative for difficulty urinating and hematuria  Musculoskeletal: Negative for arthralgias, joint swelling and myalgias  Skin: Negative for rash and wound  Neurological: Negative for dizziness, numbness and headaches  Psychiatric/Behavioral: Negative for decreased concentration, dysphoric mood and suicidal ideas  The patient is not nervous/anxious  Objective:  BP Readings from Last 1 Encounters:   09/12/18 116/72      Wt Readings from Last 1 Encounters:   09/12/18 61 2 kg (135 lb)      BMI:   Estimated body mass index is 23 17 kg/m² as calculated from the following:    Height as of this encounter: 5' 4" (1 626 m)  Weight as of this encounter: 61 2 kg (135 lb)  BSA:   Estimated body surface area is 1 65 meters squared as calculated from the following:    Height as of this encounter: 5' 4" (1 626 m)  Weight as of this encounter: 61 2 kg (135 lb)  Physical Exam   Constitutional: She is oriented to person, place, and time  She appears well-developed and well-nourished  HENT:   Head: Normocephalic and atraumatic  Eyes: Conjunctivae are normal  Pupils are equal, round, and reactive to light  Neck: Neck supple  Pulmonary/Chest: Effort normal    Neurological: She is alert and oriented to person, place, and time  Skin: Skin is warm and dry  Psychiatric: She has a normal mood and affect  Left Hip Exam     Tenderness   The patient is experiencing no tenderness  Range of Motion   The patient has normal left hip ROM  Muscle Strength   The patient has normal left hip strength       Tests   ISAAC: negative    Other   Erythema: absent  Sensation: normal  Pulse: present

## 2018-10-05 DIAGNOSIS — E55.9 VITAMIN D DEFICIENCY: Primary | ICD-10-CM

## 2018-10-05 DIAGNOSIS — M25.50 MULTIPLE JOINT PAIN: ICD-10-CM

## 2018-10-05 DIAGNOSIS — E53.8 VITAMIN B12 DEFICIENCY: ICD-10-CM

## 2018-10-05 DIAGNOSIS — Z13.220 SCREENING, LIPID: ICD-10-CM

## 2019-01-06 DIAGNOSIS — B35.1 TINEA UNGUIUM: Primary | ICD-10-CM

## 2019-01-06 RX ORDER — TERBINAFINE HYDROCHLORIDE 250 MG/1
250 TABLET ORAL DAILY
Qty: 28 TABLET | Refills: 2 | Status: SHIPPED | OUTPATIENT
Start: 2019-01-06 | End: 2019-03-31

## 2019-01-11 LAB
25(OH)D3 SERPL-MCNC: 36 NG/ML (ref 30–100)
ALBUMIN SERPL-MCNC: 4.5 G/DL (ref 3.6–5.1)
ALBUMIN/GLOB SERPL: 2.1 (CALC) (ref 1–2.5)
ALP SERPL-CCNC: 60 U/L (ref 33–130)
ALT SERPL-CCNC: 15 U/L (ref 6–29)
AST SERPL-CCNC: 19 U/L (ref 10–35)
BASOPHILS # BLD AUTO: 52 CELLS/UL (ref 0–200)
BASOPHILS NFR BLD AUTO: 1.2 %
BILIRUB SERPL-MCNC: 0.5 MG/DL (ref 0.2–1.2)
BUN SERPL-MCNC: 19 MG/DL (ref 7–25)
BUN/CREAT SERPL: NORMAL (CALC) (ref 6–22)
CALCIUM SERPL-MCNC: 9.4 MG/DL (ref 8.6–10.4)
CHLORIDE SERPL-SCNC: 102 MMOL/L (ref 98–110)
CHOLEST SERPL-MCNC: 193 MG/DL
CHOLEST/HDLC SERPL: 2.9 (CALC)
CO2 SERPL-SCNC: 29 MMOL/L (ref 20–32)
CREAT SERPL-MCNC: 0.78 MG/DL (ref 0.5–1.05)
EOSINOPHIL # BLD AUTO: 69 CELLS/UL (ref 15–500)
EOSINOPHIL NFR BLD AUTO: 1.6 %
ERYTHROCYTE [DISTWIDTH] IN BLOOD BY AUTOMATED COUNT: 12.2 % (ref 11–15)
GLOBULIN SER CALC-MCNC: 2.1 G/DL (CALC) (ref 1.9–3.7)
GLUCOSE SERPL-MCNC: 81 MG/DL (ref 65–99)
HCT VFR BLD AUTO: 40.2 % (ref 35–45)
HDLC SERPL-MCNC: 67 MG/DL
HGB BLD-MCNC: 13.6 G/DL (ref 11.7–15.5)
LDLC SERPL CALC-MCNC: 111 MG/DL (CALC)
LYMPHOCYTES # BLD AUTO: 1316 CELLS/UL (ref 850–3900)
LYMPHOCYTES NFR BLD AUTO: 30.6 %
MCH RBC QN AUTO: 30.2 PG (ref 27–33)
MCHC RBC AUTO-ENTMCNC: 33.8 G/DL (ref 32–36)
MCV RBC AUTO: 89.3 FL (ref 80–100)
MONOCYTES # BLD AUTO: 361 CELLS/UL (ref 200–950)
MONOCYTES NFR BLD AUTO: 8.4 %
NEUTROPHILS # BLD AUTO: 2503 CELLS/UL (ref 1500–7800)
NEUTROPHILS NFR BLD AUTO: 58.2 %
NONHDLC SERPL-MCNC: 126 MG/DL (CALC)
PLATELET # BLD AUTO: 251 THOUSAND/UL (ref 140–400)
PMV BLD REES-ECKER: 10.4 FL (ref 7.5–12.5)
POTASSIUM SERPL-SCNC: 3.9 MMOL/L (ref 3.5–5.3)
PROT SERPL-MCNC: 6.6 G/DL (ref 6.1–8.1)
RBC # BLD AUTO: 4.5 MILLION/UL (ref 3.8–5.1)
SL AMB EGFR AFRICAN AMERICAN: 98 ML/MIN/1.73M2
SL AMB EGFR NON AFRICAN AMERICAN: 85 ML/MIN/1.73M2
SODIUM SERPL-SCNC: 140 MMOL/L (ref 135–146)
TRIGL SERPL-MCNC: 60 MG/DL
TSH SERPL-ACNC: 1.53 MIU/L (ref 0.4–4.5)
VIT B12 SERPL-MCNC: 631 PG/ML (ref 200–1100)
WBC # BLD AUTO: 4.3 THOUSAND/UL (ref 3.8–10.8)

## 2019-06-25 ENCOUNTER — TELEPHONE (OUTPATIENT)
Dept: GASTROENTEROLOGY | Facility: CLINIC | Age: 59
End: 2019-06-25

## 2019-06-25 ENCOUNTER — CLINICAL SUPPORT (OUTPATIENT)
Dept: GASTROENTEROLOGY | Facility: CLINIC | Age: 59
End: 2019-06-25

## 2019-06-25 VITALS — HEIGHT: 64 IN | WEIGHT: 135 LBS | BODY MASS INDEX: 23.05 KG/M2

## 2019-06-25 DIAGNOSIS — Z86.010 HISTORY OF COLON POLYPS: Primary | ICD-10-CM

## 2019-07-02 ENCOUNTER — TELEPHONE (OUTPATIENT)
Dept: GASTROENTEROLOGY | Facility: CLINIC | Age: 59
End: 2019-07-02

## 2019-07-02 NOTE — TELEPHONE ENCOUNTER
Pt left  mssg stating she left here after proc around 10 this morn; about 1/2 hr after she got home around 10:30 noticed uvula in the back of her throat is getting swollen; also has petechia on back of throat at pharynx, especially on Lf side; never experienced anything like this before   Req  [phone]

## 2019-07-02 NOTE — TELEPHONE ENCOUNTER
Discussed with patient  Had some retching during colonoscopy which required her posterior pharynx to be suctioned  This is what cause the trauma    She is feeling better at this point

## 2019-07-24 DIAGNOSIS — Z12.31 ENCOUNTER FOR SCREENING MAMMOGRAM FOR BREAST CANCER: Primary | ICD-10-CM

## 2019-08-16 NOTE — PROGRESS NOTES
Assessment/Plan:    Benign findings on routine gyn exam  Recommended monthly SBE, annual CBE and annual screening mammo  ASCCP guidelines reviewed and pap with cotesting noted to be up to date, no pap done today; this low risk patient was advised she meets criteria to d/c pap screening at age 74  Colonoscopy noted to be up to date  Discussed postmenopausal considerations and symptoms to report  Kegel exercises as instructed  Discussed risks and benefits of starting vaginal estrogen cream and patient agreeable to starting estrace cream which was reviewed today  RTO in one year for routine annual gyn exam or sooner PRN  Diagnoses and all orders for this visit:    Encounter for gynecological examination (general) (routine) without abnormal findings    Vaginal atrophy  -     estradiol (ESTRACE) 0 1 mg/g vaginal cream; Insert 0 5 g into the vagina 2 (two) times a week        Subjective:      Patient ID: Madiha Borrero is a 66 y o  female  This patient presents for routine annual gyn exam  S/P RIGHT oopherectomy secondary to endometriosis  She reports vaginal dryness with dyspareunia  Has been reluctant to use vaginal estrogen cream secondary to SE  She denies  bleeding or spotting, VM sx, pelvic pain, breast concerns, abnormal discharge  , sexually active and is monogamous  Pap/HPV up to date and normal - 6/15/17, due 2020  Mammography up to date and normal - 8/4/18  She has a family hx of breast cancer with sister diagnosed at 61  Colonoscopy done 7/2/19  Father with hx of colon cancer  She is a school nurse in the [employer]  The following portions of the patient's history were reviewed and updated as appropriate: allergies, current medications, past family history, past medical history, past social history, past surgical history and problem list     Review of Systems   Constitutional: Negative  Respiratory: Negative  Cardiovascular: Negative      Gastrointestinal: Negative  Endocrine: Negative  Genitourinary: Positive for dyspareunia  Negative for dysuria, frequency, pelvic pain, urgency, vaginal bleeding, vaginal discharge and vaginal pain  Musculoskeletal: Negative  Skin: Negative  Neurological: Negative  Psychiatric/Behavioral: Negative  Objective:      /84 (BP Location: Right arm)   Pulse 76   Ht 5' 3 5" (1 613 m)   Wt 62 5 kg (137 lb 12 8 oz)   BMI 24 03 kg/m²          Physical Exam   Constitutional: She is oriented to person, place, and time  She appears well-developed and well-nourished  She is cooperative  HENT:   Head: Normocephalic and atraumatic  Neck: Normal range of motion  Neck supple  No thyroid mass and no thyromegaly present  Cardiovascular: Normal rate, regular rhythm and normal heart sounds  Pulmonary/Chest: Effort normal and breath sounds normal  Right breast exhibits no inverted nipple, no mass, no nipple discharge, no skin change and no tenderness  Left breast exhibits no inverted nipple, no mass, no nipple discharge, no skin change and no tenderness  No breast tenderness or discharge  Breasts are symmetrical    Abdominal: Soft  Normal appearance and bowel sounds are normal  There is no hepatosplenomegaly  There is no tenderness  Hernia confirmed negative in the right inguinal area and confirmed negative in the left inguinal area  Genitourinary: Vagina normal and uterus normal  Rectal exam shows no external hemorrhoid  No breast tenderness or discharge  Pelvic exam was performed with patient supine  No labial fusion  There is no rash, tenderness, lesion or injury on the right labia  There is no rash, tenderness, lesion or injury on the left labia  Uterus is not deviated, not enlarged, not fixed and not tender  Cervix exhibits no motion tenderness, no discharge and no friability  Right adnexum displays no mass, no tenderness and no fullness  Left adnexum displays no mass, no tenderness and no fullness   No erythema, tenderness or bleeding in the vagina  No signs of injury around the vagina  No vaginal discharge found  Genitourinary Comments: Urethra normal without lesions  No bladder tenderness  Moderate VVA     Musculoskeletal: Normal range of motion  Lymphadenopathy:     She has no axillary adenopathy  No inguinal adenopathy noted on the right or left side  Right: No inguinal adenopathy present  Left: No inguinal adenopathy present  Neurological: She is alert and oriented to person, place, and time  Skin: Skin is warm, dry and intact  Psychiatric: She has a normal mood and affect  Her speech is normal and behavior is normal  Cognition and memory are normal    Nursing note and vitals reviewed

## 2019-08-19 ENCOUNTER — ANNUAL EXAM (OUTPATIENT)
Dept: GYNECOLOGY | Facility: CLINIC | Age: 59
End: 2019-08-19
Payer: COMMERCIAL

## 2019-08-19 VITALS
HEART RATE: 76 BPM | SYSTOLIC BLOOD PRESSURE: 124 MMHG | BODY MASS INDEX: 23.52 KG/M2 | HEIGHT: 64 IN | WEIGHT: 137.8 LBS | DIASTOLIC BLOOD PRESSURE: 84 MMHG

## 2019-08-19 DIAGNOSIS — Z01.419 ENCOUNTER FOR GYNECOLOGICAL EXAMINATION (GENERAL) (ROUTINE) WITHOUT ABNORMAL FINDINGS: Primary | ICD-10-CM

## 2019-08-19 DIAGNOSIS — N95.2 VAGINAL ATROPHY: ICD-10-CM

## 2019-08-19 PROCEDURE — 99396 PREV VISIT EST AGE 40-64: CPT | Performed by: OBSTETRICS & GYNECOLOGY

## 2019-08-19 RX ORDER — ESTRADIOL 0.1 MG/G
0.5 CREAM VAGINAL 2 TIMES WEEKLY
Qty: 42.5 G | Refills: 4 | Status: SHIPPED | OUTPATIENT
Start: 2019-08-19 | End: 2019-08-19 | Stop reason: SDUPTHER

## 2019-08-19 RX ORDER — ESTRADIOL 0.1 MG/G
0.5 CREAM VAGINAL 2 TIMES WEEKLY
Qty: 42.5 G | Refills: 4 | Status: SHIPPED | OUTPATIENT
Start: 2019-08-19 | End: 2020-08-20 | Stop reason: SDUPTHER

## 2019-08-19 NOTE — PATIENT INSTRUCTIONS
Benign findings on routine gyn exam  Recommended monthly SBE, annual CBE and annual screening mammo  ASCCP guidelines reviewed and pap with cotesting noted to be up to date, no pap done today; this low risk patient was advised she meets criteria to d/c pap screening at age 74  Colonoscopy noted to be up to date  Discussed postmenopausal considerations and symptoms to report  Kegel exercises as instructed  Discussed risks and benefits of starting vaginal estrogen cream and patient agreeable to starting estrace cream which was reviewed today  RTO in one year for routine annual gyn exam or sooner PRN

## 2019-08-20 ENCOUNTER — HOSPITAL ENCOUNTER (OUTPATIENT)
Dept: MAMMOGRAPHY | Facility: CLINIC | Age: 59
Discharge: HOME | End: 2019-08-20
Payer: COMMERCIAL

## 2019-08-20 VITALS — HEIGHT: 63 IN | BODY MASS INDEX: 24.27 KG/M2 | WEIGHT: 137 LBS

## 2019-08-20 DIAGNOSIS — Z12.31 ENCOUNTER FOR SCREENING MAMMOGRAM FOR BREAST CANCER: ICD-10-CM

## 2019-08-20 PROCEDURE — 77067 SCR MAMMO BI INCL CAD: CPT

## 2019-08-20 PROCEDURE — 77063 BREAST TOMOSYNTHESIS BI: CPT

## 2019-10-28 ENCOUNTER — HOSPITAL ENCOUNTER (EMERGENCY)
Facility: HOSPITAL | Age: 59
Discharge: HOME | End: 2019-10-28
Attending: EMERGENCY MEDICINE
Payer: COMMERCIAL

## 2019-10-28 ENCOUNTER — APPOINTMENT (EMERGENCY)
Dept: RADIOLOGY | Facility: HOSPITAL | Age: 59
End: 2019-10-28
Payer: COMMERCIAL

## 2019-10-28 VITALS
OXYGEN SATURATION: 99 % | RESPIRATION RATE: 18 BRPM | SYSTOLIC BLOOD PRESSURE: 142 MMHG | HEIGHT: 64 IN | DIASTOLIC BLOOD PRESSURE: 73 MMHG | BODY MASS INDEX: 22.71 KG/M2 | WEIGHT: 133 LBS | HEART RATE: 76 BPM

## 2019-10-28 DIAGNOSIS — R07.9 CHEST PAIN, UNSPECIFIED TYPE: ICD-10-CM

## 2019-10-28 DIAGNOSIS — R07.9 CHEST PAIN: Primary | ICD-10-CM

## 2019-10-28 DIAGNOSIS — R09.89 LABILE HYPERTENSION: Primary | ICD-10-CM

## 2019-10-28 LAB
ALBUMIN SERPL BCP-MCNC: 4.2 G/DL (ref 3.5–5)
ALP SERPL-CCNC: 76 U/L (ref 46–116)
ALT SERPL W P-5'-P-CCNC: 22 U/L (ref 12–78)
ANION GAP SERPL CALCULATED.3IONS-SCNC: 8 MMOL/L (ref 4–13)
AST SERPL W P-5'-P-CCNC: 19 U/L (ref 5–45)
ATRIAL RATE: 79 BPM
ATRIAL RATE: 84 BPM
BASOPHILS # BLD AUTO: 0.05 THOUSANDS/ΜL (ref 0–0.1)
BASOPHILS NFR BLD AUTO: 1 % (ref 0–1)
BILIRUB SERPL-MCNC: 0.3 MG/DL (ref 0.2–1)
BUN SERPL-MCNC: 25 MG/DL (ref 5–25)
CALCIUM SERPL-MCNC: 9.3 MG/DL (ref 8.3–10.1)
CHLORIDE SERPL-SCNC: 104 MMOL/L (ref 100–108)
CO2 SERPL-SCNC: 28 MMOL/L (ref 21–32)
CREAT SERPL-MCNC: 0.92 MG/DL (ref 0.6–1.3)
EOSINOPHIL # BLD AUTO: 0.07 THOUSAND/ΜL (ref 0–0.61)
EOSINOPHIL NFR BLD AUTO: 1 % (ref 0–6)
ERYTHROCYTE [DISTWIDTH] IN BLOOD BY AUTOMATED COUNT: 11.8 % (ref 11.6–15.1)
GFR SERPL CREATININE-BSD FRML MDRD: 69 ML/MIN/1.73SQ M
GLUCOSE SERPL-MCNC: 89 MG/DL (ref 65–140)
HCT VFR BLD AUTO: 42 % (ref 34.8–46.1)
HGB BLD-MCNC: 14 G/DL (ref 11.5–15.4)
IMM GRANULOCYTES # BLD AUTO: 0.02 THOUSAND/UL (ref 0–0.2)
IMM GRANULOCYTES NFR BLD AUTO: 0 % (ref 0–2)
LYMPHOCYTES # BLD AUTO: 1.54 THOUSANDS/ΜL (ref 0.6–4.47)
LYMPHOCYTES NFR BLD AUTO: 27 % (ref 14–44)
MCH RBC QN AUTO: 30 PG (ref 26.8–34.3)
MCHC RBC AUTO-ENTMCNC: 33.3 G/DL (ref 31.4–37.4)
MCV RBC AUTO: 90 FL (ref 82–98)
MONOCYTES # BLD AUTO: 0.5 THOUSAND/ΜL (ref 0.17–1.22)
MONOCYTES NFR BLD AUTO: 9 % (ref 4–12)
NEUTROPHILS # BLD AUTO: 3.62 THOUSANDS/ΜL (ref 1.85–7.62)
NEUTS SEG NFR BLD AUTO: 62 % (ref 43–75)
NRBC BLD AUTO-RTO: 0 /100 WBCS
P AXIS: -14 DEGREES
P AXIS: 74 DEGREES
PLATELET # BLD AUTO: 251 THOUSANDS/UL (ref 149–390)
PMV BLD AUTO: 9.9 FL (ref 8.9–12.7)
POTASSIUM SERPL-SCNC: 3.7 MMOL/L (ref 3.5–5.3)
PR INTERVAL: 120 MS
PR INTERVAL: 132 MS
PROT SERPL-MCNC: 7.5 G/DL (ref 6.4–8.2)
QRS AXIS: 66 DEGREES
QRS AXIS: 70 DEGREES
QRSD INTERVAL: 70 MS
QRSD INTERVAL: 70 MS
QT INTERVAL: 372 MS
QT INTERVAL: 388 MS
QTC INTERVAL: 439 MS
QTC INTERVAL: 444 MS
RBC # BLD AUTO: 4.66 MILLION/UL (ref 3.81–5.12)
SODIUM SERPL-SCNC: 140 MMOL/L (ref 136–145)
T WAVE AXIS: 53 DEGREES
T WAVE AXIS: 62 DEGREES
TROPONIN I SERPL-MCNC: <0.02 NG/ML
TROPONIN I SERPL-MCNC: <0.02 NG/ML
VENTRICULAR RATE: 79 BPM
VENTRICULAR RATE: 84 BPM
WBC # BLD AUTO: 5.8 THOUSAND/UL (ref 4.31–10.16)

## 2019-10-28 PROCEDURE — 84484 ASSAY OF TROPONIN QUANT: CPT | Performed by: EMERGENCY MEDICINE

## 2019-10-28 PROCEDURE — 93005 ELECTROCARDIOGRAM TRACING: CPT

## 2019-10-28 PROCEDURE — 99285 EMERGENCY DEPT VISIT HI MDM: CPT

## 2019-10-28 PROCEDURE — 93010 ELECTROCARDIOGRAM REPORT: CPT | Performed by: INTERNAL MEDICINE

## 2019-10-28 PROCEDURE — 85025 COMPLETE CBC W/AUTO DIFF WBC: CPT | Performed by: EMERGENCY MEDICINE

## 2019-10-28 PROCEDURE — 71046 X-RAY EXAM CHEST 2 VIEWS: CPT

## 2019-10-28 PROCEDURE — 80053 COMPREHEN METABOLIC PANEL: CPT | Performed by: EMERGENCY MEDICINE

## 2019-10-28 PROCEDURE — 99285 EMERGENCY DEPT VISIT HI MDM: CPT | Performed by: EMERGENCY MEDICINE

## 2019-10-28 PROCEDURE — 36415 COLL VENOUS BLD VENIPUNCTURE: CPT | Performed by: EMERGENCY MEDICINE

## 2019-10-28 NOTE — ED PROVIDER NOTES
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 some diffuse chest pressure and became lightheaded  Denies assoc sob/[place], no palpitations, no n/v  Symptoms have since resolved and now just feeling anxious  No known cardiac history  Did have stress/cath about 10y ago that were normal, no eval since  Normally has normal blood pressure but noted to be hypertensive today  Denies recent illness or injury  Denies f/c/s, no cough/congestion, appetite normal, no le pain or swelling  No recent travel, surg, immob  Only supplemental estrogen is vaginal crearm twice a week  No FH of early CAD  Daughter has antiphospholipid syndrome and hx of CVA  No FH of dvt/pe        History provided by:  Patient and EMS personnel   used: No    Chest Pain   Pain location:  Substernal area  Pain quality: pressure    Pain radiates to:  Does not radiate  Pain radiates to the back: no    Onset quality:  Sudden  Timing:  Intermittent  Progression:  Waxing and waning  Chronicity:  New  Context: no stress and no trauma    Relieved by:  None tried  Worsened by:  Nothing tried  Ineffective treatments:  None tried  Associated symptoms: dizziness (lightheaded) and heartburn    Associated symptoms: no abdominal pain, no anorexia, no back pain, no cough, no diaphoresis, no fatigue, no fever, no lower extremity edema, no nausea, no palpitations, no shortness of breath, not vomiting and no weakness    Risk factors: no coronary artery disease, no high cholesterol, no hypertension, no prior DVT/PE and no smoking        Prior to Admission Medications   Prescriptions Last Dose Informant Patient Reported? Taking? Multiple Vitamin (MULTIVITAMIN) tablet  Self Yes No   Sig: Take 1 tablet by mouth daily   Multiple Vitamins-Minerals (HAIR/SKIN/NAILS) TABS  Self Yes No   Sig: Take by mouth daily   estradiol (ESTRACE) 0 1 mg/g vaginal cream   No No   Sig: Insert 0 5 g into the vagina 2 (two) times a week   levocetirizine (XYZAL) 5 MG tablet  Self Yes No   Sig: Take 5 mg by mouth every evening   mometasone (NASONEX) 50 mcg/act nasal spray  Self Yes No   Si sprays into each nostril daily      Facility-Administered Medications: None       Past Medical History:   Diagnosis Date    BRCA1 negative     done thru [employer] site    Hemorrhoids     MVP (mitral valve prolapse)        Past Surgical History:   Procedure Laterality Date    CARDIAC CATHETERIZATION      COLONOSCOPY  2019    GANGLION CYST EXCISION Right     wrist    OOPHORECTOMY Right     GA HEMORRHOIDECTOMY,INT/EXT, 2+ COLUMNS/GROUPS N/A 1/10/2017    Procedure: INTERNAL AND EXTERNAL HEMORRHOIDECTOMY ;  Surgeon: Jessica Cortez MD;  Location: [facility];  Service: Colorectal       Family History   Problem Relation Age of Onset    Melanoma Mother     Colon cancer Father     Pulmonary fibrosis Father     Stroke Daughter     Cancer Son     Breast cancer Sister     No Known Problems Maternal Grandmother     No Known Problems Maternal Grandfather     No Known Problems Paternal Grandmother     No Known Problems Paternal Grandfather     No Known Problems Maternal Aunt     Breast cancer Maternal Aunt     No Known Problems Maternal Aunt     No Known Problems Paternal Aunt     No Known Problems Paternal Aunt      I have reviewed and agree with the history as documented      Social History     Tobacco Use    Smoking status: Never Smoker    Smokeless tobacco: Never Used   Substance Use Topics    Alcohol use: Yes     Frequency: 2-3 times a week    Drug use: No        Review of Systems   Constitutional: Negative for diaphoresis, fatigue and fever  Respiratory: Negative for cough and shortness of breath  Cardiovascular: Positive for chest pain  Negative for palpitations  Gastrointestinal: Positive for heartburn  Negative for abdominal pain, anorexia, nausea and vomiting  Musculoskeletal: Negative for back pain  Neurological: Positive for dizziness (lightheaded)  Negative for weakness  All other systems reviewed and are negative  Physical Exam  Physical Exam   Constitutional: She is oriented to person, place, and time  She appears well-developed and well-nourished  anxious   HENT:   Nose: Nose normal    Eyes: Conjunctivae are normal    Neck: Neck supple  No JVD present  Cardiovascular: Normal rate and regular rhythm  No murmur heard  Pulmonary/Chest: Effort normal and breath sounds normal  No stridor  No respiratory distress  She has no wheezes  She exhibits no tenderness  Abdominal: Soft  There is no tenderness  Musculoskeletal: She exhibits no edema, tenderness or deformity  Neurological: She is alert and oriented to person, place, and time  Skin: Skin is warm  Capillary refill takes less than 2 seconds  Psychiatric: Her mood appears anxious  Nursing note and vitals reviewed        Vital Signs  ED Triage Vitals [10/28/19 1059]   Temp Pulse Respirations Blood Pressure SpO2   -- 84 20 (!) 180/102 100 %      Temp src Heart Rate Source Patient Position - Orthostatic VS BP Location FiO2 (%)   -- Monitor Sitting Right arm --      Pain Score       No Pain           Vitals:    10/28/19 1145 10/28/19 1245 10/28/19 1330 10/28/19 1500   BP: 164/77 158/72  142/73   Pulse: 80 95 76 76   Patient Position - Orthostatic VS:             Visual Acuity      ED Medications  Medications - No data to display    Diagnostic Studies  Results Reviewed     Procedure Component Value Units Date/Time    Troponin I [[id number]]  (Normal) Collected:  10/28/19 1415    Lab Status: Final result Specimen:  Blood from Arm, Right Updated:  10/28/19 1443     Troponin I <0 02 ng/mL     Troponin I [[id number]]  (Normal) Collected:  10/28/19 1102    Lab Status:  Final result Specimen:  Blood from Arm, Left Updated:  10/28/19 1136     Troponin I <0 02 ng/mL     Comprehensive metabolic panel [[id number]] Collected:  10/28/19 1102    Lab Status:  Final result Specimen:  Blood from Arm, Left Updated:  10/28/19 1133     Sodium 140 mmol/L      Potassium 3 7 mmol/L      Chloride 104 mmol/L      CO2 28 mmol/L      ANION GAP 8 mmol/L      BUN 25 mg/dL      Creatinine 0 92 mg/dL      Glucose 89 mg/dL      Calcium 9 3 mg/dL      AST 19 U/L      ALT 22 U/L      Alkaline Phosphatase 76 U/L      Total Protein 7 5 g/dL      Albumin 4 2 g/dL      Total Bilirubin 0 30 mg/dL      eGFR 69 ml/min/1 73sq m     Narrative:       [place] guidelines for Chronic Kidney Disease (CKD):     Stage 1 with normal or high GFR (GFR > 90 mL/min/1 73 square meters)    Stage 2 Mild CKD (GFR = 60-89 mL/min/1 73 square meters)    Stage 3A Moderate CKD (GFR = 45-59 mL/min/1 73 square meters)    Stage 3B Moderate CKD (GFR = 30-44 mL/min/1 73 square meters)    Stage 4 Severe CKD (GFR = 15-29 mL/min/1 73 square meters)    Stage 5 End Stage CKD (GFR <15 mL/min/1 73 square meters)  Note: GFR calculation is accurate only with a steady state creatinine    CBC and differential [[id number]] Collected:  10/28/19 1102    Lab Status:  Final result Specimen:  Blood from Arm, Left Updated:  10/28/19 1112     WBC 5 80 Thousand/uL      RBC 4 66 Million/uL      Hemoglobin 14 0 g/dL      Hematocrit 42 0 %      MCV 90 fL      MCH 30 0 pg      MCHC 33 3 g/dL      RDW 11 8 %      MPV 9 9 fL      Platelets 512 Thousands/uL      nRBC 0 /100 WBCs      Neutrophils Relative 62 %      Immat GRANS % 0 %      Lymphocytes Relative 27 %      Monocytes Relative 9 %      Eosinophils Relative 1 %      Basophils Relative 1 %      Neutrophils Absolute 3 62 Thousands/µL      Immature Grans Absolute 0 02 Thousand/uL      Lymphocytes Absolute 1 54 Thousands/µL      Monocytes Absolute 0 50 Thousand/µL      Eosinophils Absolute 0 07 Thousand/µL      Basophils Absolute 0 05 Thousands/µL                  XR chest 2 views   Final Result by Ying Dupree MD (10/28 1154)      No acute cardiopulmonary disease  Workstation performed: [id number]                    Procedures  ECG 12 Lead Documentation Only  Date/Time: 10/28/2019 11:06 AM  Performed by: Jim Nolasco DO  Authorized by: Jim Nolasco DO     ECG reviewed by me, the ED Provider: yes    Patient location:  ED  Previous ECG:     Previous ECG:  Unavailable  Interpretation:     Interpretation: normal    Rate:     ECG rate:  84    ECG rate assessment: normal    Rhythm:     Rhythm: sinus rhythm    Ectopy:     Ectopy: none    QRS:     QRS axis:  Normal  ST segments:     ST segments:  Normal  T waves:     T waves: normal    ECG 12 Lead Documentation Only  Date/Time: 10/28/2019 2:15 PM  Performed by: Jim Nolasco DO  Authorized by: Jim Nolasco DO     ECG reviewed by me, the ED Provider: yes    Patient location:  ED  Previous ECG:     Previous ECG:  Compared to current    Similarity:  No change  Interpretation:     Interpretation: normal    Rate:     ECG rate:  79    ECG rate assessment: normal    Rhythm:     Rhythm: sinus rhythm    Ectopy:     Ectopy: none    ST segments:     ST segments:  Normal  T waves:     T waves: normal             ED Course  ED Course as of Oct 29 0936   Mon Oct 28, 2019   1244 D/w pt lab/rad/ekg  HEART score 2  Will get repeat trop/ekg at 1500    If no delta, will d/c w/ rx for outpt stress            HEART Risk Score      Most Recent Value   History  0 Filed at: 10/28/2019 1159   ECG  1 Filed at: 10/28/2019 1159   Age  1 Filed at: 10/28/2019 1159   Risk Factors  0 Filed at: 10/28/2019 1159   Troponin  0 Filed at: 10/28/2019 1159   Heart Score Risk Calculator   History  0 Filed at: 10/28/2019 1159   ECG  1 Filed at: 10/28/2019 1159   Age  1 Filed at: 10/28/2019 1159   Risk Factors  0 Filed at: 10/28/2019 1159   Troponin  0 Filed at: 10/28/2019 1159   HEART Score  2 Filed at: 10/28/2019 1159   HEART Score  2 Filed at: 10/28/2019 1159                            [facility]  Number of Diagnoses or Management Options  Chest pain: new and requires workup     Amount and/or Complexity of Data Reviewed  Clinical lab tests: reviewed and ordered  Tests in the radiology section of CPT®: reviewed and ordered  Tests in the medicine section of CPT®: reviewed and ordered  Obtain history from someone other than the patient: yes  Independent visualization of images, tracings, or specimens: yes        Disposition  Final diagnoses:   Chest pain     Time reflects when diagnosis was documented in both MDM as applicable and the Disposition within this note     Time User Action Codes Description Comment    10/28/2019  2:58 PM Ivan Sacks Add [R07 9] Chest pain       ED Disposition     ED Disposition Condition Date/Time Comment    Discharge Stable Mon Oct 28, 2019  2:58 PM Sridhar Barraza discharge to home/self care              Follow-up Information     Follow up With Specialties Details Why Aidee Stubbs 89, DO Internal Medicine Schedule an appointment as soon as possible for a visit   [facility]  [address] [place] [address]            Discharge Medication List as of 10/28/2019  3:00 PM      CONTINUE these medications which have NOT CHANGED    Details   estradiol (ESTRACE) 0 1 mg/g vaginal cream Insert 0 5 g into the vagina 2 (two) times a week, Starting Mon 8/19/2019, Normal      levocetirizine (XYZAL) 5 MG tablet Take 5 mg by mouth every evening, Historical Med      mometasone (NASONEX) 50 mcg/act nasal spray 2 sprays into each nostril daily, Historical Med      Multiple Vitamin (MULTIVITAMIN) tablet Take 1 tablet by mouth daily, Historical Med Multiple Vitamins-Minerals (HAIR/SKIN/NAILS) TABS Take by mouth daily, Historical Med           Outpatient Discharge Orders   Stress test only, exercise   Standing Status: Future Standing Exp   Date: 10/28/23       ED Provider  Electronically Signed by           Jarret Goodson DO  10/29/19 1126

## 2019-10-29 ENCOUNTER — CONSULT (OUTPATIENT)
Dept: CARDIOLOGY CLINIC | Facility: MEDICAL CENTER | Age: 59
End: 2019-10-29
Payer: COMMERCIAL

## 2019-10-29 VITALS
SYSTOLIC BLOOD PRESSURE: 118 MMHG | HEART RATE: 84 BPM | BODY MASS INDEX: 23 KG/M2 | DIASTOLIC BLOOD PRESSURE: 74 MMHG | WEIGHT: 134.7 LBS | OXYGEN SATURATION: 99 % | HEIGHT: 64 IN

## 2019-10-29 DIAGNOSIS — E55.9 VITAMIN D DEFICIENCY: ICD-10-CM

## 2019-10-29 DIAGNOSIS — I34.1 MVP (MITRAL VALVE PROLAPSE): ICD-10-CM

## 2019-10-29 DIAGNOSIS — R42 DIZZINESS: Primary | ICD-10-CM

## 2019-10-29 DIAGNOSIS — R07.2 PRECORDIAL PAIN: ICD-10-CM

## 2019-10-29 DIAGNOSIS — R07.9 CHEST PAIN, UNSPECIFIED TYPE: ICD-10-CM

## 2019-10-29 DIAGNOSIS — E53.8 VITAMIN B12 DEFICIENCY: Primary | ICD-10-CM

## 2019-10-29 DIAGNOSIS — R09.89 LABILE HYPERTENSION: ICD-10-CM

## 2019-10-29 PROCEDURE — 99204 OFFICE O/P NEW MOD 45 MIN: CPT | Performed by: INTERNAL MEDICINE

## 2019-10-29 PROCEDURE — 93000 ELECTROCARDIOGRAM COMPLETE: CPT | Performed by: INTERNAL MEDICINE

## 2019-10-29 NOTE — PROGRESS NOTES
Cardiology Consultation     Heriberto Matos  [id number]  1960  [facility] [place]  Lydia Suero [address] [place] [id number]    1  Dizziness     2  Chest pain, unspecified type  Ambulatory referral to Cardiology    POCT ECG   3  Labile hypertension  Ambulatory referral to Cardiology    POCT ECG   4  MVP (mitral valve prolapse)       Chief Complaint   Patient presents with    Consult    chest heavyness     discomfort on and off for the weekend  pain goes through to her back   Dizziness     when at work with the chest heavyness,     Palpitations     occ  HPI: Patient is here for evaluation of chest heaviness that started this weekend, middle-left of her chest and some radiation to back - has been on and off the whole weekend, also associated with epigastric pain/GI upset  She also notes some lightheadedness with the chest discomfort as well, and feeling of doom that something was wrong  She had some tylenol, and her pain didn't really respond to it  In ED, troponin was negative x2  No shortness of breath, no recent PETIT, no palpitations, syncope, nausea, or diaphoresis  No recent viral respiratory illnesses  She does exercise, and feels good exercising  No recent LE edema, orthopnea, PND, or weight changes        Patient Active Problem List   Diagnosis    Spider veins of both lower extremities    Strain of left hip    Encounter for annual routine gynecological examination    External hemorrhoids    Metatarsalgia    Motion sickness    Multiple joint pain    Post-menopausal atrophic vaginitis    Vitamin B12 deficiency    Vitamin D deficiency    Chest pain    Labile hypertension    Dizziness    MVP (mitral valve prolapse)     Past Medical History:   Diagnosis Date    BRCA1 negative     done thru [employer] site    Hemorrhoids     Mitral valve prolapse     MVP (mitral valve prolapse) Social History     Socioeconomic History    Marital status: /Civil Union     Spouse name: Not on file    Number of children: Not on file    Years of education: Not on file    Highest education level: Not on file   Occupational History    Not on file   Social Needs    Financial resource strain: Not on file    Food insecurity:     Worry: Not on file     Inability: Not on file    Transportation needs:     Medical: Not on file     Non-medical: Not on file   Tobacco Use    Smoking status: Never Smoker    Smokeless tobacco: Never Used   Substance and Sexual Activity    Alcohol use: Yes     Frequency: 2-3 times a week    Drug use: No    Sexual activity: Yes     Partners: Male     Birth control/protection: Post-menopausal   Lifestyle    Physical activity:     Days per week: Not on file     Minutes per session: Not on file    Stress: Not on file   Relationships    Social connections:     Talks on phone: Not on file     Gets together: Not on file     Attends Restorationism service: Not on file     Active member of club or organization: Not on file     Attends meetings of clubs or organizations: Not on file     Relationship status: Not on file    Intimate partner violence:     Fear of current or ex partner: Not on file     Emotionally abused: Not on file     Physically abused: Not on file     Forced sexual activity: Not on file   Other Topics Concern    Not on file   Social History Narrative    Not on file      Family History   Problem Relation Age of Onset    Melanoma Mother     Hypertension Mother     Colon cancer Father     Pulmonary fibrosis Father     Stroke Daughter     Cancer Son     Breast cancer Sister     No Known Problems Maternal Grandmother     No Known Problems Maternal Grandfather     No Known Problems Paternal Grandmother     No Known Problems Paternal Grandfather     No Known Problems Maternal Aunt     Breast cancer Maternal Aunt     No Known Problems Maternal Aunt     No Known Problems Paternal Aunt     No Known Problems Paternal Aunt      Past Surgical History:   Procedure Laterality Date    CARDIAC CATHETERIZATION      COLONOSCOPY  07/02/2019    GANGLION CYST EXCISION Right     wrist    OOPHORECTOMY Right     CO HEMORRHOIDECTOMY,INT/EXT, 2+ COLUMNS/GROUPS N/A 1/10/2017    Procedure: INTERNAL AND EXTERNAL HEMORRHOIDECTOMY ;  Surgeon: Brown Deluna MD;  Location: [facility];  Service: Colorectal       Current Outpatient Medications:     estradiol (ESTRACE) 0 1 mg/g vaginal cream, Insert 0 5 g into the vagina 2 (two) times a week, Disp: 42 5 g, Rfl: 4    levocetirizine (XYZAL) 5 MG tablet, Take 5 mg by mouth every evening, Disp: , Rfl:     mometasone (NASONEX) 50 mcg/act nasal spray, 2 sprays into each nostril daily, Disp: , Rfl:     Multiple Vitamin (MULTIVITAMIN) tablet, Take 1 tablet by mouth daily, Disp: , Rfl:     Multiple Vitamins-Minerals (HAIR/SKIN/NAILS) TABS, Take by mouth daily, Disp: , Rfl:   No Known Allergies  Vitals:    10/29/19 1420   BP: 118/74   BP Location: Left arm   Patient Position: Sitting   Cuff Size: Adult   Pulse: 84   SpO2: 99%   Weight: 61 1 kg (134 lb 11 2 oz)   Height: 5' 4" (1 626 m)       Labs:  Admission on 10/28/2019, Discharged on 10/28/2019   Component Date Value    WBC 10/28/2019 5 80     RBC 10/28/2019 4 66     Hemoglobin 10/28/2019 14 0     Hematocrit 10/28/2019 42 0     MCV 10/28/2019 90     [address] 10/28/2019 30 0     MCHC 10/28/2019 33 3     RDW 10/28/2019 11 8     MPV 10/28/2019 9 9     Platelets 36/75/9301 251     nRBC 10/28/2019 0     Neutrophils Relative 10/28/2019 62     Immat GRANS % 10/28/2019 0     Lymphocytes Relative 10/28/2019 27     Monocytes Relative 10/28/2019 9     Eosinophils Relative 10/28/2019 1     Basophils Relative 10/28/2019 1     Neutrophils Absolute 10/28/2019 3 62     Immature Grans Absolute 10/28/2019 0 02     Lymphocytes Absolute 10/28/2019 1 54     Monocytes Absolute 10/28/2019 0 50     Eosinophils Absolute 10/28/2019 0 07     Basophils Absolute 10/28/2019 0 05     Sodium 10/28/2019 140     Potassium 10/28/2019 3 7     Chloride 10/28/2019 104     CO2 10/28/2019 28     ANION GAP 10/28/2019 8     BUN 10/28/2019 25     Creatinine 10/28/2019 0 92     Glucose 10/28/2019 89     Calcium 10/28/2019 9 3     AST 10/28/2019 19     ALT 10/28/2019 22     Alkaline Phosphatase 10/28/2019 76     Total Protein 10/28/2019 7 5     Albumin 10/28/2019 4 2     Total Bilirubin 10/28/2019 0 30     eGFR 10/28/2019 69     Troponin I 10/28/2019 <0 02     Ventricular Rate 10/28/2019 84     Atrial Rate 10/28/2019 84     MS Interval 10/28/2019 120     QRSD Interval 10/28/2019 70     QT Interval 10/28/2019 372     QTC Interval 10/28/2019 439     P Axis 10/28/2019 74     QRS Axis 10/28/2019 70     T Wave Axis 10/28/2019 53     Troponin I 10/28/2019 <0 02     Ventricular Rate 10/28/2019 79     Atrial Rate 10/28/2019 79     MS Interval 10/28/2019 132     QRSD Interval 10/28/2019 70     QT Interval 10/28/2019 388     QTC Interval 10/28/2019 444     P Axis 10/28/2019 -14     QRS Axis 10/28/2019 66     T Wave Axis 10/28/2019 62      Lab Results   Component Value Date    TRIG 60 01/10/2019    HDL 67 01/10/2019     Imaging: Xr Chest 2 Views    Result Date: 10/28/2019  Narrative: CHEST INDICATION:   Chest Pain  COMPARISON:  July 2, 2009 EXAM PERFORMED/VIEWS:  XR CHEST PA & LATERAL FINDINGS: Cardiomediastinal silhouette appears unremarkable  The lungs are clear  No pneumothorax or pleural effusion  Osseous structures appear within normal limits for patient age  Impression: No acute cardiopulmonary disease  Workstation performed: [id number]       Review of Systems:  Review of Systems   Constitutional: Negative for activity change, appetite change, chills, diaphoresis, fatigue and unexpected weight change  HENT: Negative for hearing loss, nosebleeds and sore throat      Eyes: Negative for photophobia and visual disturbance  Respiratory: Positive for chest tightness  Negative for cough, shortness of breath and wheezing  Cardiovascular: Negative for chest pain, palpitations and leg swelling  Gastrointestinal: Negative for abdominal pain, diarrhea, nausea and vomiting  Endocrine: Negative for polyuria  Genitourinary: Negative for dysuria, frequency and hematuria  Musculoskeletal: Negative for arthralgias, back pain, gait problem and neck pain  Skin: Negative for pallor and rash  Neurological: Positive for dizziness  Negative for syncope and headaches  Hematological: Does not bruise/bleed easily  Psychiatric/Behavioral: Negative for behavioral problems and confusion  Physical Exam:  Physical Exam   Constitutional: She is oriented to person, place, and time  She appears well-developed and well-nourished  HENT:   Head: Normocephalic and atraumatic  Nose: Nose normal    Eyes: Pupils are equal, round, and reactive to light  EOM are normal  No scleral icterus  Neck: Normal range of motion  Neck supple  No JVD present  Cardiovascular: Normal rate, regular rhythm and normal heart sounds  Exam reveals no gallop and no friction rub  No murmur heard  Pulmonary/Chest: Effort normal and breath sounds normal  No respiratory distress  She has no wheezes  She has no rales  Abdominal: Soft  Bowel sounds are normal  She exhibits no distension  There is no tenderness  Musculoskeletal: Normal range of motion  She exhibits no edema or deformity  Neurological: She is alert and oriented to person, place, and time  No cranial nerve deficit  Skin: Skin is warm and dry  No rash noted  She is not diaphoretic  Psychiatric: She has a normal mood and affect  Her behavior is normal    Vitals reviewed  Blood pressure 118/74, pulse 84, height 5' 4" (1 626 m), weight 61 1 kg (134 lb 11 2 oz), SpO2 99 %      EKG:  Normal sinus rhythm  Normal ECG    Discussion/Summary:  Chest Pain: unclear etiology  Has risk factors of HTN, post-menopausal, hormone replacement therapy  Will screen for cardiac conditions with a stress test to rule out ischemic etiology  Will also check an echocardiogram to rule out structural heart disease - especially with history of MVP  Will also check a Holter monitor to rule out arrhythmic cause, especially in light of dizziness associated with chest discomfort  She had a cardiac cath in 2009 that was normal      HTN: well controlled off any meds at the current time

## 2019-10-29 NOTE — PATIENT INSTRUCTIONS
Chest Pain   AMBULATORY CARE:   Chest pain  can be caused by a range of conditions, from not serious to life-threatening  It may be caused by a heart attack or a blood clot in your lungs  Sometimes chest pain or pressure is caused by poor blood flow to your heart (angina)  Infection, inflammation, or a fracture in the bones or cartilage in your chest can cause pain or discomfort  Chest pain can also be a symptom of a digestive problem, such as acid reflux or a stomach ulcer  An anxiety attack or a strong emotion such as anger can also cause chest pain  It is important to follow up with your healthcare provider to find the cause of your chest pain  Common symptoms you may have with chest pain:   · Fever or sweating     · Nausea or vomiting     · Shortness of breath     · Discomfort or pressure that spreads from your chest to your back, jaw, or arm     · A racing or slow heartbeat     · Feeling weak, tired, or faint  Call 911 if:   · You have any of the following signs of a heart attack:      ¨ Squeezing, pressure, or pain in your chest that lasts longer than 5 minutes or returns    ¨ Discomfort or pain in your back, neck, jaw, stomach, or arm     ¨ Trouble breathing    ¨ Nausea or vomiting    ¨ Lightheadedness or a sudden cold sweat, especially with chest pain or trouble breathing    Seek care immediately if:   · You have chest discomfort that gets worse, even with medicine  · You cough or vomit blood  · Your bowel movements are black or bloody  · You cannot stop vomiting, or it hurts to swallow  Contact your healthcare provider if:   · You have questions or concerns about your condition or care  Treatment for chest pain  may include medicine to treat your symptoms while your healthcare provider finds the cause of your chest pain  · Medicines  may be given to treat the cause of your chest pain  Examples include pain medicine, anxiety medicine, or medicines to increase blood flow to your heart  · Do not take certain medicines without asking your healthcare provider first   These include NSAIDs, herbal or vitamin supplements, or hormones (estrogen or progestin)  Follow up with your healthcare provider within 72 hours, or as directed: You may need to return for more tests to find the cause of your chest pain  You may be referred to a specialist, such as a cardiologist or gastroenterologist  Write down your questions so you remember to ask them during your visits  Healthy living tips: The following are general healthy guidelines  If your chest pain is caused by a heart problem, your healthcare provider will give you specific guidelines to follow  · Do not smoke  Nicotine and other chemicals in cigarettes and cigars can cause lung and heart damage  Ask your healthcare provider for information if you currently smoke and need help to quit  E-cigarettes or smokeless tobacco still contain nicotine  Talk to your healthcare provider before you use these products  · Eat a variety of healthy, low-fat foods  Healthy foods include fruits, vegetables, whole-grain breads, low-fat dairy products, beans, lean meats, and fish  Ask for more information about a heart healthy diet  · Ask about activity  Your healthcare provider will tell you which activities to limit or avoid  Ask when you can drive, return to work, and have sex  Ask about the best exercise plan for you  · Maintain a healthy weight  Ask your healthcare provider how much you should weigh  Ask him or her to help you create a weight loss plan if you are overweight  · Get the flu and pneumonia vaccines  All adults should get the influenza (flu) vaccine  Get it every year as soon as it becomes available  The pneumococcal vaccine is given to adults aged 72 years or older  The vaccine is given every 5 years to prevent pneumococcal disease, such as pneumonia    © 2017 [employer] Information is for End User's use only and may not be sold, redistributed or otherwise used for commercial purposes  All illustrations and images included in CareNotes® are the copyrighted property of A D A M [employer] Inc  or Matthew Gannon  The above information is an  only  It is not intended as medical advice for individual conditions or treatments  Talk to your doctor, nurse or pharmacist before following any medical regimen to see if it is safe and effective for you

## 2019-10-30 ENCOUNTER — HOSPITAL ENCOUNTER (OUTPATIENT)
Dept: NON INVASIVE DIAGNOSTICS | Facility: CLINIC | Age: 59
Discharge: HOME | End: 2019-10-30
Payer: COMMERCIAL

## 2019-10-30 ENCOUNTER — TELEPHONE (OUTPATIENT)
Dept: CARDIOLOGY CLINIC | Facility: CLINIC | Age: 59
End: 2019-10-30

## 2019-10-30 DIAGNOSIS — R42 DIZZINESS: ICD-10-CM

## 2019-10-30 DIAGNOSIS — R09.89 LABILE HYPERTENSION: ICD-10-CM

## 2019-10-30 DIAGNOSIS — I34.1 MVP (MITRAL VALVE PROLAPSE): ICD-10-CM

## 2019-10-30 DIAGNOSIS — R07.9 CHEST PAIN, UNSPECIFIED TYPE: ICD-10-CM

## 2019-10-30 DIAGNOSIS — R07.9 CHEST PAIN: ICD-10-CM

## 2019-10-30 DIAGNOSIS — I20.8 OTHER FORMS OF ANGINA PECTORIS (HCC): Primary | ICD-10-CM

## 2019-10-30 PROCEDURE — 93306 TTE W/DOPPLER COMPLETE: CPT | Performed by: INTERNAL MEDICINE

## 2019-10-30 PROCEDURE — 93016 CV STRESS TEST SUPVJ ONLY: CPT | Performed by: INTERNAL MEDICINE

## 2019-10-30 PROCEDURE — 93018 CV STRESS TEST I&R ONLY: CPT | Performed by: INTERNAL MEDICINE

## 2019-10-30 PROCEDURE — 93225 XTRNL ECG REC<48 HRS REC: CPT

## 2019-10-30 PROCEDURE — 93226 XTRNL ECG REC<48 HR SCAN A/R: CPT

## 2019-10-30 PROCEDURE — 93306 TTE W/DOPPLER COMPLETE: CPT

## 2019-10-30 PROCEDURE — 93017 CV STRESS TEST TRACING ONLY: CPT

## 2019-10-30 NOTE — TELEPHONE ENCOUNTER
Please let her know that her stress test was equivocal, and therefore we need to do a nuclear stress test to rule out ischemia  I will order this

## 2019-10-31 LAB
CHEST PAIN STATEMENT: NORMAL
MAX DIASTOLIC BP: 68 MMHG
MAX HEART RATE: 181 BPM
MAX PREDICTED HEART RATE: 163 BPM
MAX. SYSTOLIC BP: 160 MMHG
PROTOCOL NAME: NORMAL
REASON FOR TERMINATION: NORMAL
TARGET HR FORMULA: NORMAL
TEST INDICATION: NORMAL
TIME IN EXERCISE PHASE: NORMAL

## 2019-11-01 ENCOUNTER — TELEPHONE (OUTPATIENT)
Dept: CARDIOLOGY CLINIC | Facility: CLINIC | Age: 59
End: 2019-11-01

## 2019-11-01 NOTE — TELEPHONE ENCOUNTER
----- Message from Torrey Ordoñez MD sent at 10/30/2019  2:50 PM EDT -----  Please call the patient regarding her echo result  Please let her know that there were no concerning change  Her mitral valve prolapse looks very mild

## 2019-11-06 PROCEDURE — 93227 XTRNL ECG REC<48 HR R&I: CPT | Performed by: INTERNAL MEDICINE

## 2019-11-07 ENCOUNTER — TELEPHONE (OUTPATIENT)
Dept: CARDIOLOGY CLINIC | Facility: CLINIC | Age: 59
End: 2019-11-07

## 2019-11-07 NOTE — TELEPHONE ENCOUNTER
----- Message from Estela Bryant MD sent at 11/6/2019  3:45 PM EST -----  Results are within range, please notify patient

## 2019-11-11 LAB
25(OH)D3 SERPL-MCNC: 36 NG/ML (ref 30–100)
CHOLEST SERPL-MCNC: 191 MG/DL
CHOLEST/HDLC SERPL: 3.1 (CALC)
HDLC SERPL-MCNC: 62 MG/DL
LDLC SERPL CALC-MCNC: 115 MG/DL (CALC)
NONHDLC SERPL-MCNC: 129 MG/DL (CALC)
TRIGL SERPL-MCNC: 55 MG/DL
TSH SERPL-ACNC: 1.61 MIU/L (ref 0.4–4.5)
VIT B12 SERPL-MCNC: 435 PG/ML (ref 200–1100)

## 2019-11-12 ENCOUNTER — HOSPITAL ENCOUNTER (OUTPATIENT)
Dept: NON INVASIVE DIAGNOSTICS | Facility: CLINIC | Age: 59
Discharge: HOME | End: 2019-11-12
Payer: COMMERCIAL

## 2019-11-12 DIAGNOSIS — I20.8 OTHER FORMS OF ANGINA PECTORIS (HCC): ICD-10-CM

## 2019-11-12 PROCEDURE — 78452 HT MUSCLE IMAGE SPECT MULT: CPT

## 2019-11-12 PROCEDURE — 93016 CV STRESS TEST SUPVJ ONLY: CPT | Performed by: INTERNAL MEDICINE

## 2019-11-12 PROCEDURE — 93018 CV STRESS TEST I&R ONLY: CPT | Performed by: INTERNAL MEDICINE

## 2019-11-12 PROCEDURE — A9502 TC99M TETROFOSMIN: HCPCS

## 2019-11-12 PROCEDURE — 93017 CV STRESS TEST TRACING ONLY: CPT

## 2019-11-12 PROCEDURE — 78452 HT MUSCLE IMAGE SPECT MULT: CPT | Performed by: INTERNAL MEDICINE

## 2019-11-13 ENCOUNTER — TELEPHONE (OUTPATIENT)
Dept: CARDIOLOGY CLINIC | Facility: CLINIC | Age: 59
End: 2019-11-13

## 2019-11-13 NOTE — TELEPHONE ENCOUNTER
----- Message from Fernando An MD sent at 11/13/2019 10:41 AM EST -----  Results are within range, please notify patient

## 2019-11-14 LAB
CHEST PAIN STATEMENT: NORMAL
MAX DIASTOLIC BP: 68 MMHG
MAX HEART RATE: 179 BPM
MAX PREDICTED HEART RATE: 163 BPM
MAX. SYSTOLIC BP: 169 MMHG
PROTOCOL NAME: NORMAL
REASON FOR TERMINATION: NORMAL
TARGET HR FORMULA: NORMAL
TEST INDICATION: NORMAL
TIME IN EXERCISE PHASE: NORMAL

## 2019-11-27 ENCOUNTER — OFFICE VISIT (OUTPATIENT)
Dept: FAMILY MEDICINE CLINIC | Facility: HOSPITAL | Age: 59
End: 2019-11-27
Payer: COMMERCIAL

## 2019-11-27 VITALS
BODY MASS INDEX: 23.73 KG/M2 | HEIGHT: 64 IN | SYSTOLIC BLOOD PRESSURE: 120 MMHG | DIASTOLIC BLOOD PRESSURE: 80 MMHG | HEART RATE: 80 BPM | OXYGEN SATURATION: 98 % | WEIGHT: 139 LBS

## 2019-11-27 DIAGNOSIS — I34.1 MVP (MITRAL VALVE PROLAPSE): ICD-10-CM

## 2019-11-27 DIAGNOSIS — R10.13 EPIGASTRIC ABDOMINAL PAIN: Primary | ICD-10-CM

## 2019-11-27 DIAGNOSIS — R42 DIZZINESS: ICD-10-CM

## 2019-11-27 DIAGNOSIS — I20.8 OTHER FORMS OF ANGINA PECTORIS (HCC): ICD-10-CM

## 2019-11-27 PROCEDURE — 99214 OFFICE O/P EST MOD 30 MIN: CPT | Performed by: INTERNAL MEDICINE

## 2019-11-27 NOTE — PROGRESS NOTES
Assessment/Plan:             Problem List Items Addressed This Visit        Cardiovascular and Mediastinum    MVP (mitral valve prolapse)       Other    Dizziness      Other Visit Diagnoses     Epigastric abdominal pain    -  Primary    Relevant Orders    Ambulatory referral to Gastroenterology    CT abdomen pelvis w contrast            Subjective:      Patient ID: Jeffry Schultz is a 64 y o  female    1  Chest pain/ epigastric area  -  event- went to eR- no nausea but felt lightheaded and had chest discomfort for a few days prior to that with some pressure- then Monday AM woke with epigastric issues and had worsening pain at work  Felt lightheadedness when sitting up - none since that event    then saw cardiology- had echo and nuclear stress test- has mild diastolic dysfunction and bowing of mitral valve- no true prolapse  Had egd done 10 years ago- started on protonix in past week  but no improvement  initiallyi n er bp was elevated but none since and also has home readings that are normal- was normal in stress testing  2   midthoracic pain- had some shoulder issues about 4 months- did some exercises with imprvement- and did some massages- improved somewhat but still ahving       The following portions of the patient's history were reviewed and updated as appropriate: allergies, current medications and problem list      Review of Systems   HENT: Negative for congestion  Respiratory: Negative for shortness of breath and wheezing  All other systems reviewed and are negative          Objective:      Current Outpatient Medications:     estradiol (ESTRACE) 0 1 mg/g vaginal cream, Insert 0 5 g into the vagina 2 (two) times a week (Patient not taking: Reported on 11/27/2019), Disp: 42 5 g, Rfl: 4    levocetirizine (XYZAL) 5 MG tablet, Take 5 mg by mouth every evening, Disp: , Rfl:     mometasone (NASONEX) 50 mcg/act nasal spray, 2 sprays into each nostril daily, Disp: , Rfl:     Multiple Vitamin (MULTIVITAMIN) tablet, Take 1 tablet by mouth daily, Disp: , Rfl:     Multiple Vitamins-Minerals (HAIR/SKIN/NAILS) TABS, Take by mouth daily, Disp: , Rfl:     Blood pressure 120/80, pulse 80, height 5' 4" (1 626 m), weight 63 kg (139 lb), SpO2 98 %  Physical Exam   Constitutional: She is oriented to person, place, and time  She appears well-developed and well-nourished  No distress  HENT:   Head: Normocephalic  Eyes: Right eye exhibits no discharge  Left eye exhibits no discharge  No scleral icterus  Neck: No thyromegaly present  Cardiovascular: Normal rate and regular rhythm  No murmur heard  Pulmonary/Chest: Effort normal and breath sounds normal  No stridor  No respiratory distress  Abdominal: Soft  There is tenderness  midepigastric   Musculoskeletal: She exhibits no edema or deformity  Lymphadenopathy:     She has no cervical adenopathy  Neurological: She is alert and oriented to person, place, and time  Psychiatric: She has a normal mood and affect  Her behavior is normal  Thought content normal    Nursing note and vitals reviewed

## 2019-11-29 DIAGNOSIS — Z11.59 ENCOUNTER FOR HEPATITIS C SCREENING TEST FOR LOW RISK PATIENT: ICD-10-CM

## 2019-11-29 DIAGNOSIS — R10.10 PAIN OF UPPER ABDOMEN: Primary | ICD-10-CM

## 2019-12-06 ENCOUNTER — CLINICAL SUPPORT (OUTPATIENT)
Dept: FAMILY MEDICINE CLINIC | Facility: HOSPITAL | Age: 59
End: 2019-12-06
Payer: COMMERCIAL

## 2019-12-06 DIAGNOSIS — Z23 ENCOUNTER FOR IMMUNIZATION: Primary | ICD-10-CM

## 2019-12-06 PROCEDURE — 90471 IMMUNIZATION ADMIN: CPT | Performed by: FAMILY MEDICINE

## 2019-12-06 PROCEDURE — 90715 TDAP VACCINE 7 YRS/> IM: CPT | Performed by: FAMILY MEDICINE

## 2019-12-20 ENCOUNTER — OFFICE VISIT (OUTPATIENT)
Dept: CARDIOLOGY CLINIC | Facility: CLINIC | Age: 59
End: 2019-12-20
Payer: COMMERCIAL

## 2019-12-20 VITALS
HEIGHT: 64 IN | DIASTOLIC BLOOD PRESSURE: 62 MMHG | WEIGHT: 136 LBS | HEART RATE: 76 BPM | SYSTOLIC BLOOD PRESSURE: 116 MMHG | BODY MASS INDEX: 23.22 KG/M2

## 2019-12-20 DIAGNOSIS — R07.89 OTHER CHEST PAIN: Primary | ICD-10-CM

## 2019-12-20 DIAGNOSIS — I34.1 MVP (MITRAL VALVE PROLAPSE): ICD-10-CM

## 2019-12-20 PROCEDURE — 99214 OFFICE O/P EST MOD 30 MIN: CPT | Performed by: INTERNAL MEDICINE

## 2019-12-20 NOTE — PROGRESS NOTES
Cardiology Consultation     Kary Coleman  [id number]  1960  [facility] [address]  [address] [facility] [address]    1  Other chest pain     2  MVP (mitral valve prolapse)       Chief Complaint   Patient presents with    Follow-up     4 week    Palpitations     HPI: Patient is here for evaluation of chest heaviness that was in the middle-left of her chest and some radiation to back - has been on and off the whole weekend, also associated with epigastric pain/GI upset  She also notes some lightheadedness with the chest discomfort as well, and feeling of doom that something was wrong  She had some tylenol, and her pain didn't really respond to it  In ED, troponin was negative x2  No shortness of breath, no recent PETIT, no palpitations, syncope, nausea, or diaphoresis  No recent viral respiratory illnesses  She does exercise, and feels good exercising  No recent LE edema, orthopnea, PND, or weight changes  No further episodes of chest pain or palpitations since she was here at her last visit      Patient Active Problem List   Diagnosis    Spider veins of both lower extremities    Strain of left hip    Encounter for annual routine gynecological examination    External hemorrhoids    Metatarsalgia    Motion sickness    Multiple joint pain    Post-menopausal atrophic vaginitis    Vitamin B12 deficiency    Vitamin D deficiency    Chest pain    Dizziness    MVP (mitral valve prolapse)     Past Medical History:   Diagnosis Date    BRCA1 negative     done thru [facility]    Hemorrhoids     Mitral valve prolapse     MVP (mitral valve prolapse)      Social History     Socioeconomic History    Marital status: /Civil Union     Spouse name: Not on file    Number of children: Not on file    Years of education: Not on file    Highest education level: Not on file   Occupational History    Not on file   Social Needs    Financial resource strain: Not on file    Food insecurity:     Worry: Not on file     Inability: Not on file    Transportation needs:     Medical: Not on file     Non-medical: Not on file   Tobacco Use    Smoking status: Never Smoker    Smokeless tobacco: Never Used   Substance and Sexual Activity    Alcohol use: Yes     Frequency: 2-3 times a week    Drug use: No    Sexual activity: Yes     Partners: Male     Birth control/protection: Post-menopausal   Lifestyle    Physical activity:     Days per week: Not on file     Minutes per session: Not on file    Stress: Not on file   Relationships    Social connections:     Talks on phone: Not on file     Gets together: Not on file     Attends [employer] service: Not on file     Active member of club or organization: Not on file     Attends meetings of clubs or organizations: Not on file     Relationship status: Not on file    Intimate partner violence:     Fear of current or ex partner: Not on file     Emotionally abused: Not on file     Physically abused: Not on file     Forced sexual activity: Not on file   Other Topics Concern    Not on file   Social History Narrative    Wears seatbelt    Regular dental care     Regular exercise       Family History   Problem Relation Age of Onset    Melanoma Mother     Hypertension Mother     Colon cancer Father     Pulmonary fibrosis Father     Stroke Daughter     Cancer Son     Breast cancer Sister     No Known Problems Maternal Grandmother     No Known Problems Maternal Grandfather     No Known Problems Paternal Grandmother     No Known Problems Paternal Grandfather     No Known Problems Maternal Aunt     Breast cancer Maternal Aunt     No Known Problems Maternal Aunt     No Known Problems Paternal Aunt     No Known Problems Paternal Aunt      Past Surgical History:   Procedure Laterality Date    CARDIAC CATHETERIZATION      COLONOSCOPY  07/02/2019    GANGLION CYST EXCISION Right     wrist    OOPHORECTOMY Right     AL HEMORRHOIDECTOMY,INT/EXT, 2+ COLUMNS/GROUPS N/A 1/10/2017    Procedure: INTERNAL AND EXTERNAL HEMORRHOIDECTOMY ;  Surgeon: Audra Curry MD;  Location: [facility];  Service: Colorectal       Current Outpatient Medications:     estradiol (ESTRACE) 0 1 mg/g vaginal cream, Insert 0 5 g into the vagina 2 (two) times a week (Patient not taking: Reported on 11/27/2019), Disp: 42 5 g, Rfl: 4    levocetirizine (XYZAL) 5 MG tablet, Take 5 mg by mouth every evening, Disp: , Rfl:     mometasone (NASONEX) 50 mcg/act nasal spray, 2 sprays into each nostril daily, Disp: , Rfl:     Multiple Vitamin (MULTIVITAMIN) tablet, Take 1 tablet by mouth daily, Disp: , Rfl:     Multiple Vitamins-Minerals (HAIR/SKIN/NAILS) TABS, Take by mouth daily, Disp: , Rfl:   No Known Allergies  Vitals:    12/20/19 0802   BP: 116/62   BP Location: Left arm   Patient Position: Sitting   Cuff Size: Standard   Pulse: 76   Weight: 61 7 kg (136 lb)   Height: 5' 4" (1 626 m)       Labs:  Hospital Outpatient Visit on 11/12/2019   Component Date Value    Protocol Name 11/12/2019 ALAN     Time In Exercise Phase 11/12/2019 00:11:01     MAX   SYSTOLIC BP 62/97/9105 741     Max Diastolic Bp 45/25/8639 68     Max Heart Rate 11/12/2019 179     Max Predicted Heart Rate 11/12/2019 163     Reason for Termination 11/12/2019 Fatigue     Test Indication 11/12/2019 Chest Pain/Dizziness/Epigastric Pain     Target Hr Formular 11/12/2019 (220 - Age)*100%     Chest Pain Statement 11/12/2019 none    Orders Only on 11/09/2019   Component Date Value    Total Cholesterol 11/09/2019 191     HDL 11/09/2019 62     Triglycerides 11/09/2019 55     LDL Direct 11/09/2019 115*    Chol HDLC Ratio 11/09/2019 3 1     Non-HDL Cholesterol 11/09/2019 129     Vitamin B-12 11/09/2019 435     Vitamin D, 25-Hydroxy, S* 11/09/2019 36     TSH W/RFX TO FREE T4 11/09/2019 1 61    Hospital Outpatient Visit on 10/30/2019   Component Date Value    Protocol Name 10/30/2019 Minda Blue Time In Exercise Phase 10/30/2019 00:12:01     MAX   SYSTOLIC BP 02/33/7526 482     Max Diastolic Bp 65/84/0784 68     Max Heart Rate 10/30/2019 181     Max Predicted Heart Rate 10/30/2019 163     Reason for Termination 10/30/2019 Fatigue     Test Indication 10/30/2019 Chest Discomfort and dizziness     Target Hr Formular 10/30/2019 (220 - Age)*100%     Chest Pain Statement 10/30/2019 none    Admission on 10/28/2019, Discharged on 10/28/2019   Component Date Value    WBC 10/28/2019 5 80     RBC 10/28/2019 4 66     Hemoglobin 10/28/2019 14 0     Hematocrit 10/28/2019 42 0     MCV 10/28/2019 90     MCH 10/28/2019 30 0     MCHC 10/28/2019 33 3     RDW 10/28/2019 11 8     MPV 10/28/2019 9 9     Platelets 72/64/4392 251     nRBC 10/28/2019 0     Neutrophils Relative 10/28/2019 62     Immat GRANS % 10/28/2019 0     Lymphocytes Relative 10/28/2019 27     Monocytes Relative 10/28/2019 9     Eosinophils Relative 10/28/2019 1     Basophils Relative 10/28/2019 1     Neutrophils Absolute 10/28/2019 3 62     Immature Grans Absolute 10/28/2019 0 02     Lymphocytes Absolute 10/28/2019 1 54     Monocytes Absolute 10/28/2019 0 50     Eosinophils Absolute 10/28/2019 0 07     Basophils Absolute 10/28/2019 0 05     Sodium 10/28/2019 140     Potassium 10/28/2019 3 7     Chloride 10/28/2019 104     CO2 10/28/2019 28     ANION GAP 10/28/2019 8     BUN 10/28/2019 25     Creatinine 10/28/2019 0 92     Glucose 10/28/2019 89     Calcium 10/28/2019 9 3     AST 10/28/2019 19     ALT 10/28/2019 22     Alkaline Phosphatase 10/28/2019 76     Total Protein 10/28/2019 7 5     Albumin 10/28/2019 4 2     Total Bilirubin 10/28/2019 0 30     eGFR 10/28/2019 69     Troponin I 10/28/2019 <0 02     Ventricular Rate 10/28/2019 84     Atrial Rate 10/28/2019 84     OK Interval 10/28/2019 120     QRSD Interval 10/28/2019 70     QT Interval 10/28/2019 372     QTC Interval 10/28/2019 439     P Axis 10/28/2019 74     QRS Axis 10/28/2019 70     T Wave Axis 10/28/2019 53     Troponin I 10/28/2019 <0 02     Ventricular Rate 10/28/2019 79     Atrial Rate 10/28/2019 79     DE Interval 10/28/2019 132     QRSD Interval 10/28/2019 70     QT Interval 10/28/2019 388     QTC Interval 10/28/2019 444     P Axis 10/28/2019 -14     QRS Axis 10/28/2019 66     T Wave Axis 10/28/2019 62      Lab Results   Component Value Date    TRIG 55 11/09/2019    HDL 62 11/09/2019     Imaging: Xr Chest 2 Views    Result Date: 10/28/2019  Narrative: CHEST INDICATION:   Chest Pain  COMPARISON:  July 2, 2009 EXAM PERFORMED/VIEWS:  XR CHEST PA & LATERAL FINDINGS: Cardiomediastinal silhouette appears unremarkable  The lungs are clear  No pneumothorax or pleural effusion  Osseous structures appear within normal limits for patient age  Impression: No acute cardiopulmonary disease  Workstation performed: [id number]       Review of Systems:  Review of Systems   Constitutional: Negative for activity change, appetite change, chills, diaphoresis, fatigue and unexpected weight change  HENT: Negative for hearing loss, nosebleeds and sore throat  Eyes: Negative for photophobia and visual disturbance  Respiratory: Negative for cough, chest tightness, shortness of breath and wheezing  Cardiovascular: Negative for chest pain, palpitations and leg swelling  Gastrointestinal: Negative for abdominal pain, diarrhea, nausea and vomiting  Endocrine: Negative for polyuria  Genitourinary: Negative for dysuria, frequency and hematuria  Musculoskeletal: Negative for arthralgias, back pain, gait problem and neck pain  Skin: Negative for pallor and rash  Neurological: Negative for dizziness, syncope and headaches  Hematological: Does not bruise/bleed easily  Psychiatric/Behavioral: Negative for behavioral problems and confusion         Physical Exam:  Physical Exam   Constitutional: She is oriented to person, place, and time  She appears well-developed and well-nourished  HENT:   Head: Normocephalic and atraumatic  Nose: Nose normal    Eyes: Pupils are equal, round, and reactive to light  EOM are normal  No scleral icterus  Neck: Normal range of motion  Neck supple  No JVD present  Cardiovascular: Normal rate, regular rhythm and normal heart sounds  Exam reveals no gallop and no friction rub  No murmur heard  Pulmonary/Chest: Effort normal and breath sounds normal  No respiratory distress  She has no wheezes  She has no rales  Abdominal: Soft  Bowel sounds are normal  She exhibits no distension  There is no tenderness  Musculoskeletal: Normal range of motion  She exhibits no edema or deformity  Neurological: She is alert and oriented to person, place, and time  No cranial nerve deficit  Skin: Skin is warm and dry  No rash noted  She is not diaphoretic  Psychiatric: She has a normal mood and affect  Her behavior is normal    Vitals reviewed  Blood pressure 116/62, pulse 76, height 5' 4" (1 626 m), weight 61 7 kg (136 lb)  EKG:  Normal sinus rhythm  Normal ECG    Discussion/Summary:  Chest Pain: unclear etiology  Has risk factors of HTN, post-menopausal, hormone replacement therapy  We screened for cardiac conditions with a stress test that ruled out ischemic etiology  We also checked an echocardiogram that ruled out structural heart disease - especially with history of MVP - which is mildly bowing without full prolapse  We also check a Holter monitor that ruled out arrhythmic cause, especially in light of dizziness associated with chest discomfort  She had a cardiac cath in 2009 that was normal   No further cardiac work-up recommended at this time  HTN: well controlled off any meds at the current time

## 2019-12-27 ENCOUNTER — HOSPITAL ENCOUNTER (OUTPATIENT)
Dept: CT IMAGING | Facility: HOSPITAL | Age: 59
Discharge: HOME | End: 2019-12-27
Attending: INTERNAL MEDICINE
Payer: COMMERCIAL

## 2019-12-27 DIAGNOSIS — R10.13 EPIGASTRIC ABDOMINAL PAIN: ICD-10-CM

## 2019-12-27 PROCEDURE — 74177 CT ABD & PELVIS W/CONTRAST: CPT

## 2019-12-27 RX ADMIN — IOHEXOL 90 ML: 350 INJECTION, SOLUTION INTRAVENOUS at 10:12

## 2019-12-30 NOTE — NURSING NOTE
Phone call to patient, s/p IV contrast extravasation 12/27/2019  Approx 10 ml omni 350 infiltrate right antecubital IV site  Per pt site " is fine", denies redness, swelling or discomfort  Per pt no problems with sensation  Pt will c/b with any problems or concerns

## 2020-01-10 ENCOUNTER — OFFICE VISIT (OUTPATIENT)
Dept: GASTROENTEROLOGY | Facility: CLINIC | Age: 60
End: 2020-01-10
Payer: COMMERCIAL

## 2020-01-10 VITALS
BODY MASS INDEX: 23.56 KG/M2 | HEART RATE: 80 BPM | HEIGHT: 64 IN | WEIGHT: 138 LBS | SYSTOLIC BLOOD PRESSURE: 122 MMHG | DIASTOLIC BLOOD PRESSURE: 76 MMHG

## 2020-01-10 DIAGNOSIS — R10.13 EPIGASTRIC ABDOMINAL PAIN: ICD-10-CM

## 2020-01-10 DIAGNOSIS — Z86.010 PERSONAL HISTORY OF COLONIC POLYPS: ICD-10-CM

## 2020-01-10 DIAGNOSIS — R09.89 CHRONIC THROAT CLEARING: ICD-10-CM

## 2020-01-10 DIAGNOSIS — Z80.0 FAMILY HISTORY OF COLON CANCER: ICD-10-CM

## 2020-01-10 DIAGNOSIS — R07.89 OTHER CHEST PAIN: Primary | ICD-10-CM

## 2020-01-10 PROCEDURE — 99214 OFFICE O/P EST MOD 30 MIN: CPT | Performed by: INTERNAL MEDICINE

## 2020-01-10 NOTE — PROGRESS NOTES
[address] [facility] - Outpatient Consultation  Seamus Carvajal 64 y o  female MRN: [id number]  Encounter: [id number]    ASSESSMENT AND PLAN:      1  Epigastric abdominal pain  2  Other chest pain  Symptomatic in late October, evaluated in the emergency department then by Cardiology  Unremarkable CT  Symptoms are now quiescent  Differential for this noncardiac chest pain includes esophageal spasm or esophageal reflux  We discussed workup options including upper endoscopy or barium swallow  She is asymptomatic will defer on EGD  Will order barium swallow to assess motility and evaluate chronic throat clearing    3  Personal history of colonic polyps  4  Family history of colon cancer  Negative colonoscopy July 2019, 5 year recall  5  Chronic throat clearing  Will assess further on barium swallow    Followup Appointment:  Pending above  ______________________________________________________________________    Chief Complaint   Patient presents with    Chest Pain     referred by pcp       HPI:   Seamus Carvajal is a 64y o  year old female who presents for consultation at the request of her PCP regarding noncardiac chest pain  Symptoms began in late October  She started with epigastric then substernal burning pain  She initially thought symptoms were due to a tele and food  The following Monday while at work symptoms worsened and she developed lightheadedness, prompting evaluation in the ER  She was subsequently evaluated by Cardiology including stress testing, echo and Holter monitor  No etiology was identified  She took a PPI around the onset of symptoms for a few days but did not notice any change in symptoms  Symptoms gradually improved without intervention She typically avoids trigger foods like onions, garlic and spicy foods  She denies NSAIDs  She had similar symptoms about 10 years ago  Upper endoscopy April 2009 showed normal mucosa    She currently denies any reflux complaints or dysphagia  She has longstanding issues with throat clearing  Symptoms occur throughout the day  They are not related to swallowing  She denies any cough  She denies any classic reflux complaints  She has never had ENT workup        Historical Information   Past Medical History:   Diagnosis Date    BRCA1 negative     done thru [employer] web site    Hemorrhoids     Mitral valve prolapse     MVP (mitral valve prolapse)      Past Surgical History:   Procedure Laterality Date    CARDIAC CATHETERIZATION      COLONOSCOPY  07/02/2019    GANGLION CYST EXCISION Right     wrist    OOPHORECTOMY Right     MD HEMORRHOIDECTOMY,INT/EXT, 2+ COLUMNS/GROUPS N/A 1/10/2017    Procedure: INTERNAL AND EXTERNAL HEMORRHOIDECTOMY ;  Surgeon: Avni Stoll MD;  Location: [facility];  Service: Colorectal     Social History     Substance and Sexual Activity   Alcohol Use Yes    Frequency: 2-3 times a week     Social History     Substance and Sexual Activity   Drug Use No     Social History     Tobacco Use   Smoking Status Never Smoker   Smokeless Tobacco Never Used     Family History   Problem Relation Age of Onset    Melanoma Mother     Hypertension Mother     Colon cancer Father     Pulmonary fibrosis Father     Stroke Daughter    Vanna Hayes Cancer Son     Breast cancer Sister     No Known Problems Maternal Grandmother     No Known Problems Maternal Grandfather     No Known Problems Paternal Grandmother     No Known Problems Paternal Grandfather     No Known Problems Maternal Aunt     Breast cancer Maternal Aunt     No Known Problems Maternal Aunt     No Known Problems Paternal Aunt     No Known Problems Paternal Aunt        Meds/Allergies     Current Outpatient Medications:     mometasone (NASONEX) 50 mcg/act nasal spray    Multiple Vitamin (MULTIVITAMIN) tablet    Multiple Vitamins-Minerals (EMERGEN-C IMMUNE PO)    Multiple Vitamins-Minerals (HAIR/SKIN/NAILS) TABS    estradiol (ESTRACE) 0 1 mg/g vaginal cream    levocetirizine (XYZAL) 5 MG tablet    No Known Allergies    PHYSICAL EXAM:    Blood pressure 122/76, pulse 80, height 5' 4" (1 626 m), weight 62 6 kg (138 lb)  Body mass index is 23 69 kg/m²  General Appearance: NAD, cooperative, alert  Eyes: Anicteric, PERRLA, EOMI  ENT:  Normocephalic, atraumatic, normal mucosa  Neck:  Supple, symmetrical, trachea midline,   Resp:  Clear to auscultation bilaterally; no rales, rhonchi or wheezing; respirations unlabored   CV:  S1 S2, Regular rate and rhythm; no murmur, rub, or gallop  GI:  Soft, non-tender, non-distended; normal bowel sounds; no masses, no organomegaly   Rectal: Deferred  Musculoskeletal: No cyanosis, clubbing or edema  Normal ROM  Skin:  No jaundice, rashes, or lesions   Heme/Lymph: No palpable cervical lymphadenopathy  Psych: Normal affect, good eye contact  Neuro: No gross deficits, AAOx3    Lab Results:   Lab Results   Component Value Date    WBC 5 80 10/28/2019    HGB 14 0 10/28/2019    HCT 42 0 10/28/2019    MCV 90 10/28/2019     10/28/2019     Lab Results   Component Value Date    K 3 7 10/28/2019     10/28/2019    CO2 28 10/28/2019    BUN 25 10/28/2019    CREATININE 0 92 10/28/2019    CALCIUM 9 3 10/28/2019    AST 19 10/28/2019    ALT 22 10/28/2019    ALKPHOS 76 10/28/2019    EGFR 69 10/28/2019     No results found for: IRON, TIBC, FERRITIN  No results found for: LIPASE    Radiology Results:   Ct Abdomen Pelvis W Contrast    Result Date: 12/27/2019  Narrative: CT ABDOMEN AND PELVIS WITH IV CONTRAST INDICATION:   R10 13: Epigastric pain  COMPARISON:  None  TECHNIQUE:  CT examination of the abdomen and pelvis was performed  Axial, sagittal, and coronal 2D reformatted images were created from the source data and submitted for interpretation  Radiation dose length product (DLP) for this visit:  297 64 mGy-cm     This examination, like all CT scans performed in the [facility], was performed utilizing techniques to minimize radiation dose exposure, including the use of iterative  reconstruction and automated exposure control  IV Contrast:  90 mL of iohexol (OMNIPAQUE) Enteric Contrast:  Enteric contrast was administered  FINDINGS: ABDOMEN LOWER CHEST:  No clinically significant abnormality identified in the visualized lower chest  LIVER/BILIARY TREE:  Unremarkable  GALLBLADDER:  No calcified gallstones  No pericholecystic inflammatory change  SPLEEN:  Unremarkable  PANCREAS:  Unremarkable  ADRENAL GLANDS:  Unremarkable  KIDNEYS/URETERS:  Unremarkable  No hydronephrosis  STOMACH AND BOWEL:  Unremarkable  APPENDIX:  A normal appendix was visualized  (Series 601 images 45 through 60 ) ABDOMINOPELVIC CAVITY:  No ascites or free intraperitoneal air  No lymphadenopathy  VESSELS:  Unremarkable for patient's age  PELVIS REPRODUCTIVE ORGANS:  Unremarkable for patient's age  URINARY BLADDER:  Unremarkable  ABDOMINAL WALL/INGUINAL REGIONS:  Unremarkable  OSSEOUS STRUCTURES:  No acute fracture or destructive osseous lesion  Impression: Normal abdomen and pelvic CT  Workstation performed: [id number]       REVIEW OF SYSTEMS:    CONSTITUTIONAL: Denies any fever, chills, rigors, and weight loss  HEENT: No earache or tinnitus  Denies hearing loss or visual disturbances  CARDIOVASCULAR: No chest pain or palpitations  RESPIRATORY: Denies any cough, hemoptysis, shortness of breath or dyspnea on exertion  GASTROINTESTINAL: As noted in the History of Present Illness  GENITOURINARY: No problems with urination  Denies any hematuria or dysuria  NEUROLOGIC: No dizziness or vertigo, denies headaches  MUSCULOSKELETAL: Denies any muscle or joint pain  SKIN: Denies skin rashes or itching  ENDOCRINE: Denies excessive thirst  Denies intolerance to heat or cold  PSYCHOSOCIAL: Denies depression or anxiety  Denies any recent memory loss

## 2020-01-20 ENCOUNTER — TRANSCRIBE ORDERS (OUTPATIENT)
Dept: RADIOLOGY | Facility: HOSPITAL | Age: 60
End: 2020-01-20

## 2020-01-20 ENCOUNTER — HOSPITAL ENCOUNTER (OUTPATIENT)
Dept: RADIOLOGY | Facility: HOSPITAL | Age: 60
Discharge: HOME | End: 2020-01-20
Payer: COMMERCIAL

## 2020-01-20 DIAGNOSIS — R10.13 EPIGASTRIC ABDOMINAL PAIN: ICD-10-CM

## 2020-01-20 PROCEDURE — 74220 X-RAY XM ESOPHAGUS 1CNTRST: CPT

## 2020-03-01 DIAGNOSIS — L03.011 CELLULITIS OF FINGER OF RIGHT HAND: Primary | ICD-10-CM

## 2020-03-01 RX ORDER — CEPHALEXIN 500 MG/1
500 CAPSULE ORAL EVERY 8 HOURS SCHEDULED
Qty: 21 CAPSULE | Refills: 0 | Status: SHIPPED | OUTPATIENT
Start: 2020-03-01 | End: 2020-03-08

## 2020-07-26 DIAGNOSIS — B37.3 MONILIAL VAGINITIS: Primary | ICD-10-CM

## 2020-07-26 RX ORDER — FLUCONAZOLE 150 MG/1
TABLET ORAL
Qty: 2 TABLET | Refills: 1 | Status: SHIPPED | OUTPATIENT
Start: 2020-07-26 | End: 2020-07-29

## 2020-08-14 ENCOUNTER — TRANSCRIBE ORDERS (OUTPATIENT)
Dept: ADMINISTRATIVE | Facility: HOSPITAL | Age: 60
End: 2020-08-14

## 2020-08-14 DIAGNOSIS — Z12.31 ENCOUNTER FOR SCREENING MAMMOGRAM FOR MALIGNANT NEOPLASM OF BREAST: Primary | ICD-10-CM

## 2020-08-19 NOTE — PROGRESS NOTES
Assessment/Plan:    Benign findings on routine gyn exam  Recommended monthly SBE, annual CBE and annual screening mammo  ASCCP guidelines reviewed and pap with cotesting noted to be up to date; this low risk patient was advised she meets criteria to d/c pap screening at age 74  Pap done today  Colonoscopy noted to be up to date  The patient denies STI risk factors and declines testing at this time  Reviewed diet/activity recommendations Calcium 8064-8836 mg and Vit D 600-1000 IU daily  Discussed postmenopausal considerations and symptoms to report  Kegel exercises as instructed  RTO in one year for routine annual gyn exam or sooner PRN  Diagnoses and all orders for this visit:    Encounter for gynecological examination (general) (routine) without abnormal findings    Encounter for screening mammogram for malignant neoplasm of breast    Encounter for Papanicolaou smear for cervical cancer screening  -     Liquid-based pap, screening        Subjective:      Patient ID: Edwige Soler is a 64 y o  female  This patient presents for routine annual gyn exam   She denies acute gyn complaints  She was started on estrace cream at last AE, but stopped all medication after having cardiac symptoms  Cardiac work up normal  She has restarted cream about 5 weeks ago and has noticed improvement  She denies  bleeding or spotting, VM sx, pelvic pain, breast concerns, abnormal discharge, bowel/bladder dysfunction  , sexually active and is monogamous  Denies STI concerns  No hx of STIs  Pap/HPV up to date and normal, 6/15/17  Mammography up to date and normal, 8/20/19  Colonoscopy up to date and normal, 7/2/19  The following portions of the patient's history were reviewed and updated as appropriate: allergies, current medications, past family history, past medical history, past social history, past surgical history and problem list     Review of Systems   Constitutional: Negative  Respiratory: Negative  Cardiovascular: Negative  Gastrointestinal: Negative  Endocrine: Negative  Genitourinary: Negative for dyspareunia, dysuria, frequency, pelvic pain, urgency, vaginal bleeding, vaginal discharge and vaginal pain  Musculoskeletal: Negative  Skin: Negative  Neurological: Negative  Psychiatric/Behavioral: Negative  Objective:      /88 (BP Location: Left arm, Cuff Size: Large)   Pulse 90   Ht 5' 3" (1 6 m)   Wt 61 4 kg (135 lb 6 4 oz)   LMP  (LMP Unknown)   BMI 23 99 kg/m²          Physical Exam  Vitals signs and nursing note reviewed  Exam conducted with a chaperone present  Constitutional:       Appearance: Normal appearance  She is well-developed  HENT:      Head: Normocephalic and atraumatic  Neck:      Musculoskeletal: Normal range of motion and neck supple  Thyroid: No thyroid mass or thyromegaly  Cardiovascular:      Rate and Rhythm: Normal rate and regular rhythm  Heart sounds: Normal heart sounds  Pulmonary:      Effort: Pulmonary effort is normal       Breath sounds: Normal breath sounds  Chest:      Breasts: Breasts are symmetrical          Right: No inverted nipple, mass, nipple discharge, skin change or tenderness  Left: No inverted nipple, mass, nipple discharge, skin change or tenderness  Abdominal:      General: Bowel sounds are normal       Palpations: Abdomen is soft  Tenderness: There is no abdominal tenderness  Hernia: There is no hernia in the left inguinal area or right inguinal area  Genitourinary:     General: Normal vulva  Exam position: Supine  Pubic Area: No rash  Labia:         Right: No rash, tenderness, lesion or injury  Left: No rash, tenderness, lesion or injury  Urethra: No prolapse, urethral pain, urethral swelling or urethral lesion  Vagina: Normal  No signs of injury and foreign body   No vaginal discharge, erythema, tenderness, bleeding, lesions or prolapsed vaginal walls  Cervix: No cervical motion tenderness, discharge, friability, lesion, erythema, cervical bleeding or eversion  Uterus: Not deviated, not enlarged, not fixed, not tender and no uterine prolapse  Adnexa:         Right: No mass, tenderness or fullness  Left: No mass, tenderness or fullness  Rectum: No external hemorrhoid  Comments: Urethra normal without lesions  No bladder tenderness  Musculoskeletal: Normal range of motion  Lymphadenopathy:      Lower Body: No right inguinal adenopathy  No left inguinal adenopathy  Skin:     General: Skin is warm and dry  Neurological:      Mental Status: She is alert and oriented to person, place, and time  Psychiatric:         Speech: Speech normal          Behavior: Behavior normal  Behavior is cooperative

## 2020-08-20 ENCOUNTER — ANNUAL EXAM (OUTPATIENT)
Dept: GYNECOLOGY | Facility: CLINIC | Age: 60
End: 2020-08-20
Payer: COMMERCIAL

## 2020-08-20 VITALS
SYSTOLIC BLOOD PRESSURE: 130 MMHG | DIASTOLIC BLOOD PRESSURE: 88 MMHG | HEIGHT: 63 IN | WEIGHT: 135.4 LBS | BODY MASS INDEX: 23.99 KG/M2 | HEART RATE: 90 BPM

## 2020-08-20 DIAGNOSIS — Z12.4 ENCOUNTER FOR PAPANICOLAOU SMEAR FOR CERVICAL CANCER SCREENING: ICD-10-CM

## 2020-08-20 DIAGNOSIS — N95.2 VAGINAL ATROPHY: ICD-10-CM

## 2020-08-20 DIAGNOSIS — Z12.31 ENCOUNTER FOR SCREENING MAMMOGRAM FOR MALIGNANT NEOPLASM OF BREAST: ICD-10-CM

## 2020-08-20 DIAGNOSIS — Z01.419 ENCOUNTER FOR GYNECOLOGICAL EXAMINATION (GENERAL) (ROUTINE) WITHOUT ABNORMAL FINDINGS: Primary | ICD-10-CM

## 2020-08-20 PROCEDURE — 3008F BODY MASS INDEX DOCD: CPT | Performed by: OBSTETRICS & GYNECOLOGY

## 2020-08-20 PROCEDURE — 99396 PREV VISIT EST AGE 40-64: CPT | Performed by: OBSTETRICS & GYNECOLOGY

## 2020-08-20 PROCEDURE — G0145 SCR C/V CYTO,THINLAYER,RESCR: HCPCS | Performed by: OBSTETRICS & GYNECOLOGY

## 2020-08-20 PROCEDURE — 87624 HPV HI-RISK TYP POOLED RSLT: CPT | Performed by: OBSTETRICS & GYNECOLOGY

## 2020-08-20 PROCEDURE — 1036F TOBACCO NON-USER: CPT | Performed by: OBSTETRICS & GYNECOLOGY

## 2020-08-20 RX ORDER — ESTRADIOL 0.1 MG/G
0.5 CREAM VAGINAL 2 TIMES WEEKLY
Qty: 42.5 G | Refills: 2 | Status: SHIPPED | OUTPATIENT
Start: 2020-08-20 | End: 2022-01-17 | Stop reason: SDUPTHER

## 2020-08-23 DIAGNOSIS — L73.9 FOLLICULITIS: Primary | ICD-10-CM

## 2020-08-24 ENCOUNTER — HOSPITAL ENCOUNTER (OUTPATIENT)
Dept: MAMMOGRAPHY | Facility: CLINIC | Age: 60
Discharge: HOME | End: 2020-08-24
Payer: COMMERCIAL

## 2020-08-24 VITALS — HEIGHT: 63 IN | WEIGHT: 135 LBS | BODY MASS INDEX: 23.92 KG/M2

## 2020-08-24 DIAGNOSIS — Z12.31 ENCOUNTER FOR SCREENING MAMMOGRAM FOR MALIGNANT NEOPLASM OF BREAST: ICD-10-CM

## 2020-08-24 PROCEDURE — 77067 SCR MAMMO BI INCL CAD: CPT

## 2020-08-24 PROCEDURE — 77063 BREAST TOMOSYNTHESIS BI: CPT

## 2020-08-27 LAB
HPV HR 12 DNA CVX QL NAA+PROBE: NEGATIVE
HPV16 DNA CVX QL NAA+PROBE: NEGATIVE
HPV18 DNA CVX QL NAA+PROBE: NEGATIVE

## 2020-08-28 LAB
LAB AP GYN PRIMARY INTERPRETATION: NORMAL
Lab: NORMAL

## 2020-09-26 DIAGNOSIS — Z11.59 ENCOUNTER FOR SCREENING FOR OTHER VIRAL DISEASES: Primary | ICD-10-CM

## 2020-09-26 DIAGNOSIS — Z11.59 ENCOUNTER FOR SCREENING FOR OTHER VIRAL DISEASES: ICD-10-CM

## 2020-09-26 PROCEDURE — 87635 SARS-COV-2 COVID-19 AMP PRB: CPT

## 2020-09-27 LAB — SARS-COV-2 RNA RESP QL NAA+PROBE: NEGATIVE

## 2020-09-30 DIAGNOSIS — B96.89 ACUTE BACTERIAL BRONCHITIS: Primary | ICD-10-CM

## 2020-09-30 DIAGNOSIS — J20.8 ACUTE BACTERIAL BRONCHITIS: Primary | ICD-10-CM

## 2020-09-30 RX ORDER — AZITHROMYCIN 250 MG/1
TABLET, FILM COATED ORAL
Qty: 6 TABLET | Refills: 0 | Status: SHIPPED | OUTPATIENT
Start: 2020-09-30 | End: 2020-10-04

## 2021-01-08 ENCOUNTER — IMMUNIZATIONS (OUTPATIENT)
Dept: FAMILY MEDICINE CLINIC | Facility: HOSPITAL | Age: 61
End: 2021-01-08

## 2021-01-08 DIAGNOSIS — Z23 ENCOUNTER FOR IMMUNIZATION: ICD-10-CM

## 2021-01-08 PROCEDURE — 91301 SARS-COV-2 / COVID-19 MRNA VACCINE (MODERNA) 100 MCG: CPT

## 2021-01-08 PROCEDURE — 0011A SARS-COV-2 / COVID-19 MRNA VACCINE (MODERNA) 100 MCG: CPT

## 2021-01-26 DIAGNOSIS — N30.01 ACUTE CYSTITIS WITH HEMATURIA: Primary | ICD-10-CM

## 2021-01-26 RX ORDER — CIPROFLOXACIN 500 MG/1
500 TABLET, FILM COATED ORAL EVERY 12 HOURS SCHEDULED
Qty: 14 TABLET | Refills: 0 | Status: SHIPPED | OUTPATIENT
Start: 2021-01-26 | End: 2021-02-02

## 2021-02-04 ENCOUNTER — IMMUNIZATIONS (OUTPATIENT)
Dept: FAMILY MEDICINE CLINIC | Facility: HOSPITAL | Age: 61
End: 2021-02-04

## 2021-02-04 DIAGNOSIS — Z23 ENCOUNTER FOR IMMUNIZATION: Primary | ICD-10-CM

## 2021-02-04 PROCEDURE — 91301 SARS-COV-2 / COVID-19 MRNA VACCINE (MODERNA) 100 MCG: CPT

## 2021-02-04 PROCEDURE — 0012A SARS-COV-2 / COVID-19 MRNA VACCINE (MODERNA) 100 MCG: CPT

## 2021-03-11 ENCOUNTER — HOSPITAL ENCOUNTER (OUTPATIENT)
Dept: RADIOLOGY | Facility: HOSPITAL | Age: 61
Discharge: HOME | End: 2021-03-11
Payer: COMMERCIAL

## 2021-03-11 DIAGNOSIS — M54.6 ACUTE LEFT-SIDED THORACIC BACK PAIN: Primary | ICD-10-CM

## 2021-03-11 DIAGNOSIS — M54.6 ACUTE LEFT-SIDED THORACIC BACK PAIN: ICD-10-CM

## 2021-03-11 PROCEDURE — 72072 X-RAY EXAM THORAC SPINE 3VWS: CPT

## 2021-07-06 ENCOUNTER — NURSE TRIAGE (OUTPATIENT)
Dept: PHYSICAL THERAPY | Facility: OTHER | Age: 61
End: 2021-07-06

## 2021-07-06 DIAGNOSIS — M54.6 ACUTE RIGHT-SIDED THORACIC BACK PAIN: Primary | ICD-10-CM

## 2021-07-06 NOTE — TELEPHONE ENCOUNTER
Additional Information   Negative: Is this related to a work injury?  Negative: Is this related to an MVA?  Negative: Are you currently recieving homecare services?  Negative: Has the patient had unexplained weight loss?  Negative: Does the patient have a fever?  Negative: Is the patient experiencing blood in sputum?  Negative: Has the patient experienced major trauma? (fall from height, high speed collision, direct blow to spine) and is also experiencing nausea, light-headedness, or loss of consciousness?  Negative: Is the patient experiencing urine retention?  Negative: Is the patient experiencing acute drop foot or paralysis?  Affirmative: Is this a chronic condition? Background - Initial Assessment  Clinical complaint: Left sided and midline thoracic back pain  Date of onset: Intermittent over the past few years- March 2021 thoracic area started worsening after shower  Frequency of pain: intermittent  Quality of pain: dull ache, "muscle spasm"    Protocols used: SL AMB COMPREHENSIVE SPINE PROGRAM PROTOCOL    Patient LM today requesting CB  Nurse returned call and reviewed the program with her  Triaged for above complaints- acute worsening of chronic thoracic back pain and NO RF s/s present  Patient stated this has occurred in the past, but resolved  Pain is not severe, but "is not going away"  Referral entered for the [facility] site and contact info given to her as well  Patient very appreciative of CB  Nurse wished her well and NO referral to closed

## 2021-07-07 ENCOUNTER — EVALUATION (OUTPATIENT)
Dept: PHYSICAL THERAPY | Facility: CLINIC | Age: 61
End: 2021-07-07
Payer: COMMERCIAL

## 2021-07-07 DIAGNOSIS — M54.6 ACUTE RIGHT-SIDED THORACIC BACK PAIN: ICD-10-CM

## 2021-07-07 PROCEDURE — 97110 THERAPEUTIC EXERCISES: CPT | Performed by: PHYSICAL THERAPIST

## 2021-07-07 PROCEDURE — 97162 PT EVAL MOD COMPLEX 30 MIN: CPT | Performed by: PHYSICAL THERAPIST

## 2021-07-07 NOTE — PROGRESS NOTES
PT Evaluation     Today's date: 2021  Patient name: Marshall Keita  : 1960  MRN: [id number]  Referring provider: Amena Ramon MD  Dx:   Encounter Diagnosis     ICD-10-CM    1  Acute right-sided thoracic back pain  M54 6 Ambulatory referral to PT spine                  Assessment  Assessment details: Marshall Keita is a 64 y o  female presenting to outpatient physical therapy with chief complaints of thoracic spine pain (L+R)  Patient describes insidious onset of right sided thoracic spine pain in March with persistent discomfort  Patient displays with good thoracic ROM, parascapular strength deficit, extension bias with mechanical testing, and functional deficits  Patient's symptoms are multifactorial in nature with a primary movement diagnosis of thoracic hypomobility resulting in pathoanatomical signs and symptoms consistent with Acute right-sided thoracic back pain resulting in limitations in her ability to exercise and work without restrictions  No further referral appears necessary at this time based upon examination results  Please contact me if you have any questions ([phone])  Thank you for the opportunity to share in the care of this patient  Impairments: abnormal or restricted ROM, abnormal movement, activity intolerance, impaired physical strength, lacks appropriate home exercise program, pain with function, poor posture  and poor body mechanics    Symptom irritability: lowUnderstanding of Dx/Px/POC: good   Prognosis: good  Prognosis details: Positive prognostic indicators include positive attitude toward recovery, motivated to improve, high self-efficacy, good understanding of condition, realistic expectations  Negative prognostic indicators include chronicity of symptoms  Goals  STG to be met in 2 weeks:  - Increase (B) UE strength by 1/2 MMT grade  - I with HEP    - Patient will be able to all exercises without increased pain     LTG to be met in 4 weeks:  - Increase (B) UE strength to 4+/5 all planes  - I with updated HEP   - Patient will be able to manage symptoms independently  - Patient will be able to work without restrictions  Plan  Plan details: Prognosis above is given PT services 2x/week tapering to 1x/week over the next 4 weeks and home program adherence  Patient would benefit from: skilled physical therapy  Planned modality interventions: cryotherapy and thermotherapy: hydrocollator packs  Planned therapy interventions: activity modification, joint mobilization, manual therapy, neuromuscular re-education, body mechanics training, patient education, postural training, strengthening, stretching, therapeutic activities, therapeutic exercise, functional ROM exercises and home exercise program  Plan of Care beginning date: 2021  Plan of Care expiration date: 2021  Treatment plan discussed with: patient        Subjective Evaluation    History of Present Illness  Mechanism of injury: At Evaluation (2021): Patient reports in March she had an episode of severe (R) sided thoracic spine pain - resolved with medication and activity modification  She had x-rays which showed some degenerative disc disease  The comprehensive spine program was recommended        Red Flag Screen:  Patient denies recent fever, changes in bowel and bladder function, nausea and vomiting, weakness, unexplained weight loss, tingling, numbness, pain with coughing, or traumatic FELICIA      Greatest concern: how to manage things as we age - what is the right exercise vs  Wrong exercise     Pain  Current pain ratin  At best pain ratin  At worst pain ratin  Location: (L) and (R) sided medial scapular border   Quality: dull ache    Social Support  Lives with: spouse    Employment status: working (School nurse)    Diagnostic Tests  X-ray: abnormal  Patient Goals  Patient goal: Patient Specific Functional Scale: how to manage symptoms 6/10, return to regular exercise 9/10, performing job without pain 8/10; Total 23/30        Objective     Static Posture     Thoracic Spine  Rotated right  Comments  (+) Benny's forward bend test    Palpation     Additional Palpation Details  Slight tenderness along (L) medial scapular border    Active Range of Motion   Cervical/Thoracic Spine       Thoracic    Flexion:  WFL  Extension:  WFL  Left lateral flexion:  WFL  Right lateral flexion:  WFL  Left rotation:  WFL  Right rotation:  [facility]    Strength/Myotome Testing     Left Shoulder     Planes of Motion   Flexion: 5   Extension: 5   Abduction: 5     Isolated Muscles   Lower trapezius: 4   Middle trapezius: 4   Serratus anterior: 4     Right Shoulder     Planes of Motion   Flexion: 5   Extension: 5   Abduction: 5     Isolated Muscles   Lower trapezius: 4+   Middle trapezius: 4+   Serratus anterior: 4+     Left Elbow   Flexion: 5  Extension: 5    Right Elbow   Flexion: 5  Extension: 5    Tests     Additional Tests Details  Repeated Thoracic Extension in Sitting: decreased, better  Repeated Cervical Extension in Sitting: NE, NE             Daily Treatment Diary     DX: (R) sided thoracic pain  EPOC: 8/4/21  Precautions: NA  CO-MORBIDITIES: NA  HEP ACCESS CODE: [id number]    Stage: acute on chronic  Stability of Symptoms:  At Evaluation: evolving - improving  Global Rating of Change:   Symptom Irritability Level: low  Primary Movement Diagnosis: thoracic hypomobility    Patient Specific Functional Scale:   7/7/21: how to manage symptoms 6/10, return to regular exercise 9/10, performing job without pain 8/10;  Total 23/30  FOTO Prediction: 91 by visit 7  FOTO Progress: 94 at evaluation   Greatest Concern: how to manage things as we age - what is the right exercise vs  Wrong exercise   Current Activity Recommendations: added to HEP (7/7)  Current Educational Needs: progressions       Treatment Diary          Manual Therapy         Thoracic SNAGs         Thoracic Mobs Therapeutic Exercise  HEP         UBE                    Repeated Thoracic Ext 7/7         Repeated Cervical Ext 7/7         S/L Thoracic Rotation Stretch 7/7         1/2 Thoracic Rotation Stretch 7/7                   Prone Sh' 4 Way 7/7                   Thoracic Foam Rolling                     Neuromuscular Reeducation          1/2 Kneeling Chop and Lift with KS          TB Sh' PNF D1 Flex          TB Sh' PNF D2 Flex          TB Sh' PNF D1 Ext          TB Sh' PNF D2 Ext                    TB Sh' Reach at [facility]                                                  Therapeutic Activity                              Gait Training                                        Modalities

## 2021-07-14 ENCOUNTER — OFFICE VISIT (OUTPATIENT)
Dept: PHYSICAL THERAPY | Facility: CLINIC | Age: 61
End: 2021-07-14
Payer: COMMERCIAL

## 2021-07-14 DIAGNOSIS — M54.6 ACUTE RIGHT-SIDED THORACIC BACK PAIN: Primary | ICD-10-CM

## 2021-07-14 PROCEDURE — 97112 NEUROMUSCULAR REEDUCATION: CPT | Performed by: PHYSICAL THERAPIST

## 2021-07-14 PROCEDURE — 97140 MANUAL THERAPY 1/> REGIONS: CPT | Performed by: PHYSICAL THERAPIST

## 2021-07-14 PROCEDURE — 97110 THERAPEUTIC EXERCISES: CPT | Performed by: PHYSICAL THERAPIST

## 2021-07-14 NOTE — PROGRESS NOTES
Daily Note     Today's date: 2021  Patient name: Ros Guzman  : 1960  MRN: [id number]  Referring provider: Avani Figueroa MD  Dx:   Encounter Diagnosis     ICD-10-CM    1  Acute right-sided thoracic back pain  M54 6                   Subjective: Patient reports good tolerance to her IE and HEP  She reports having some discomfort but not severe  She notes overdoing things a little bit cleaning out a room in her house  She has no questions with her HEP  Objective: See treatment diary below      Assessment:   Stage: acute on chronic  Stability of Symptoms:  At Evaluation: evolving - improving  Global Rating of Change:   Symptom Irritability Level: low  Primary Movement Diagnosis: thoracic hypomobility    Patient Specific Functional Scale:   21: how to manage symptoms 6/10, return to regular exercise 9/10, performing job without pain 8/10; Total   FOTO Prediction: 91 by visit 7  FOTO Progress: 94 at evaluation   Greatest Concern: how to manage things as we age - what is the right exercise vs  Wrong exercise   Current Activity Recommendations: added to HEP (); added to HEP ()  Current Educational Needs: progressions    Patient had good tolerance to manual treatment - improved mobility following  She had good form and understanding with previously issued HEP  Foam rolling was performed with good understanding as well  She had no complaints of increased pain post treatment  Skilled PT continues to be required to guide progression of mobility and strength to allow patient to return to regular exercise and participation at work  Plan: Continue with POC - monitor tolerance to treatment and updated HEP        Daily Treatment Diary     DX: (R) sided thoracic pain  EPOC: 21  Precautions: NA  CO-MORBIDITIES: NA  HEP ACCESS CODE: [id number]           Treatment Diary          Manual Therapy         Thoracic SNAGs Rotate  3 min        Thoracic Mobs Therapeutic Exercise  HEP         UBE  2'/2'                  Repeated Thoracic Ext 7/7 15x        Repeated Cervical Ext 7/7 15x         S/L Thoracic Rotation Stretch 7/7 15x ea        1/2 Thoracic Rotation Stretch 7/7 15x ea                  Prone Sh' 4 Way 7/7 Row and Ext  3# 15x  ABD and Y  2# 15x                  Thoracic Foam Rolling   2 min                  Neuromuscular Reeducation          1/2 Kneeling Chop and Lift with VT          TB Sh' PNF D1 Flex 7/14 OTB  10x ea        TB Sh' PNF D2 Flex 7/14 OTB  10x ea        TB Sh' PNF D1 Ext 7/14 GTB  10x ea        TB Sh' PNF D2 Ext 7/14 GTB  10x ea                  TB Sh' Reach at [facility]                                                  Therapeutic Activity                              Gait Training                                        Modalities

## 2021-07-16 ENCOUNTER — OFFICE VISIT (OUTPATIENT)
Dept: PHYSICAL THERAPY | Facility: CLINIC | Age: 61
End: 2021-07-16
Payer: COMMERCIAL

## 2021-07-16 DIAGNOSIS — M54.6 ACUTE RIGHT-SIDED THORACIC BACK PAIN: Primary | ICD-10-CM

## 2021-07-16 PROCEDURE — 97110 THERAPEUTIC EXERCISES: CPT | Performed by: PHYSICAL THERAPIST

## 2021-07-16 PROCEDURE — 97112 NEUROMUSCULAR REEDUCATION: CPT | Performed by: PHYSICAL THERAPIST

## 2021-07-16 PROCEDURE — 97140 MANUAL THERAPY 1/> REGIONS: CPT | Performed by: PHYSICAL THERAPIST

## 2021-07-16 NOTE — PROGRESS NOTES
Daily Note     Today's date: 2021  Patient name: Nessa Dong  : 1960  MRN: [id number]  Referring provider: Marry Ash MD  Dx:   Encounter Diagnosis     ICD-10-CM    1  Acute right-sided thoracic back pain  M54 6                   Subjective: Patient reports no adverse reaction to her LV  She reports her HEP is going well  She has not current symptoms  Objective: See treatment diary below      Assessment:   Stage: acute on chronic  Stability of Symptoms:  At Evaluation: evolving - improving  Global Rating of Change:   Symptom Irritability Level: low  Primary Movement Diagnosis: thoracic hypomobility    Patient Specific Functional Scale:   21: how to manage symptoms 6/10, return to regular exercise 9/10, performing job without pain 8/10; Total   FOTO Prediction: 91 by visit 7  FOTO Progress: 94 at evaluation   Greatest Concern: how to manage things as we age - what is the right exercise vs  Wrong exercise   Current Activity Recommendations: added to HEP (); added to HEP (); added to HEP ()  Current Educational Needs: progressions    Patient continues to respond well to manual treatment - thoracic mobility is improving well  She demonstrated good form throughout treatment  Added to HEP with good understanding  She had no complaints of increased pain post treatment  Skilled PT continues to be required to guide progression of mobility and strength to allow patient to return to regular exercise and participation at work  Plan: Continue with POC - monitor tolerance to treatment and updated HEP        Daily Treatment Diary     DX: (R) sided thoracic pain  EPOC: 21  Precautions: NA  CO-MORBIDITIES: NA  HEP ACCESS CODE: [id number]         Treatment Diary         Manual Therapy         Thoracic SNAGs Rotate  3 min Rotate  3 min       Thoracic Mobs Prone   5 min Prone  5 min                                                    Therapeutic Exercise  HEP UBE  2'/2' 2'/2'                 Repeated Thoracic Ext 7/7 15x 15x        Repeated Cervical Ext 7/7 15x  15x        S/L Thoracic Rotation Stretch 7/7 15x ea 15x ea       1/2 Kneeling Thoracic Rotation Stretch 7/7 15x ea 15x ea                 Prone Sh' 4 Way 7/7 Row and Ext  3# 15x  ABD and Y  2# 15x Row and Ext  3# 15x  ABD and Y  2# 15x                 Thoracic Foam Rolling   2 min                  Neuromuscular Reeducation          1/2 Kneeling Chop and Lift with MI   10x ea       TB Sh' PNF D1 Flex 7/14 OTB  10x ea OTB  15x ea       TB Sh' PNF D2 Flex 7/14 OTB  10x ea OTB  15x ea       TB Sh' PNF D1 Ext 7/14 GTB  10x ea GTB  15x ea       TB Sh' PNF D2 Ext 7/14 GTB  10x ea GTB  15x ea                 TB Sh' Reach at [facility]   3 Way  OTB  5x ea                                               Therapeutic Activity                              Gait Training                                        Modalities

## 2021-07-19 ENCOUNTER — OFFICE VISIT (OUTPATIENT)
Dept: PHYSICAL THERAPY | Facility: CLINIC | Age: 61
End: 2021-07-19
Payer: COMMERCIAL

## 2021-07-19 DIAGNOSIS — M54.6 ACUTE RIGHT-SIDED THORACIC BACK PAIN: Primary | ICD-10-CM

## 2021-07-19 PROCEDURE — 97112 NEUROMUSCULAR REEDUCATION: CPT | Performed by: PHYSICAL THERAPIST

## 2021-07-19 PROCEDURE — 97110 THERAPEUTIC EXERCISES: CPT | Performed by: PHYSICAL THERAPIST

## 2021-07-19 PROCEDURE — 97140 MANUAL THERAPY 1/> REGIONS: CPT | Performed by: PHYSICAL THERAPIST

## 2021-07-19 NOTE — PROGRESS NOTES
Daily Note     Today's date: 2021  Patient name: Nessa Dong  : 1960  MRN: [id number]  Referring provider: Marry Ash MD  Dx:   Encounter Diagnosis     ICD-10-CM    1  Acute right-sided thoracic back pain  M54 6                   Subjective: Patient reports having some increased pain yesterday with more activity at home  She performed her HEP and felt less discomfort and improved mobility following  Objective: See treatment diary below      Assessment:   Stage: acute on chronic  Stability of Symptoms:  At Evaluation: evolving - improving  Global Rating of Change:   Symptom Irritability Level: low  Primary Movement Diagnosis: thoracic hypomobility    Patient Specific Functional Scale:   21: how to manage symptoms 6/10, return to regular exercise 9/10, performing job without pain 8/10; Total   FOTO Prediction: 91 by visit 7  FOTO Progress: 94 at evaluation   Greatest Concern: how to manage things as we age - what is the right exercise vs  Wrong exercise   Current Activity Recommendations: added to HEP (); added to HEP (); added to HEP ()  Current Educational Needs: progressions    Patient continues to respond well to manual treatment  She had no complaints of increased pain throughout treatment  She had good form throughout treatment  Skilled PT continues to be required to guide progression of mobility and strength to allow patient to return to regular exercise and participation at work       Plan: Continue with POC - follow up with patient following her vacation      Daily Treatment Diary     DX: (R) sided thoracic pain  EPOC: 21  Precautions: NA  CO-MORBIDITIES: NA  HEP ACCESS CODE: [id number]         Treatment Diary        Manual Therapy         Thoracic SNAGs Rotate  3 min Rotate  3 min Rotate  3 min      Thoracic Mobs Prone   5 min Prone  5 min Prone  5 min                                                   Therapeutic Exercise  HEP         UBE 2'/2' 2'/2' 2'/2'                Repeated Thoracic Ext 7/7 15x 15x  15x       Repeated Cervical Ext 7/7 15x  15x  15x      S/L Thoracic Rotation Stretch 7/7 15x ea 15x ea 15x ea      1/2 Kneeling Thoracic Rotation Stretch 7/7 15x ea 15x ea 15x ea                Prone Sh' 4 Way 7/7 Row and Ext  3# 15x  ABD and Y  2# 15x Row and Ext  3# 15x  ABD and Y  2# 15x Row and Ext  4# 15x  ABD and Y  3# 15x                Thoracic Foam Rolling   2 min                  Neuromuscular Reeducation          1/2 Kneeling Chop and Lift with OH   10x ea 15x ea      TB Sh' PNF D1 Flex 7/14 OTB  10x ea OTB  15x ea OTB  15x ea      TB Sh' PNF D2 Flex 7/14 OTB  10x ea OTB  15x ea OTB  15x ea      TB Sh' PNF D1 Ext 7/14 GTB  10x ea GTB  15x ea GTB  15x ea      TB Sh' PNF D2 Ext 7/14 GTB  10x ea GTB  15x ea GTB  15x ea                TB Sh' Reach at [facility] 7/14  3 Way  OTB  5x ea 3 Way  OTB  5x ea                                              Therapeutic Activity                              Gait Training                                        Modalities

## 2021-08-03 ENCOUNTER — OFFICE VISIT (OUTPATIENT)
Dept: PHYSICAL THERAPY | Facility: CLINIC | Age: 61
End: 2021-08-03
Payer: COMMERCIAL

## 2021-08-03 DIAGNOSIS — M54.6 ACUTE RIGHT-SIDED THORACIC BACK PAIN: Primary | ICD-10-CM

## 2021-08-03 PROCEDURE — 97110 THERAPEUTIC EXERCISES: CPT | Performed by: PHYSICAL THERAPIST

## 2021-08-03 PROCEDURE — 97112 NEUROMUSCULAR REEDUCATION: CPT | Performed by: PHYSICAL THERAPIST

## 2021-08-03 NOTE — PROGRESS NOTES
Daily Note     Today's date: 8/3/2021  Patient name: Vickie Coronado  : 1960  MRN: [id number]  Referring provider: Daniel Martin MD  Dx:   Encounter Diagnosis     ICD-10-CM    1  Acute right-sided thoracic back pain  M54 6                   Subjective: Patient reports good tolerance to her LV  She reports she was very active with her family on vacation  She reports her back is feeling very good and she is able to manage her symptoms well  Objective: See treatment diary below      Assessment:   Stage: acute on chronic  Stability of Symptoms:  At Evaluation: evolving - improving  Global Rating of Change:   Symptom Irritability Level: low  Primary Movement Diagnosis: thoracic hypomobility    Patient Specific Functional Scale:   21: how to manage symptoms 6/10, return to regular exercise 9/10, performing job without pain 8/10; Total /30  8/3/21: how to manage symptoms 10/10, return to regular exercise 10/10, performing job without pain 10/10; Total 30/30  FOTO Prediction: 91 by visit 7  FOTO Progress: 94 at evaluation; 8/3/21: 94  Greatest Concern: how to manage things as we age - what is the right exercise vs  Wrong exercise   Current Activity Recommendations: added to HEP (); added to HEP (); added to HEP ()  Current Educational Needs: progressions    Patient had good exercise form and recall throughout treatment  She displays good improvement with PSFS and FOTO scores  She has met her functional goals and is I with HEP  At this time it is recommended that she continue with an I HEP  She is to contact me if she has return of symptoms or any questions/ concerns          Plan: DC to I HEP     Daily Treatment Diary     DX: (R) sided thoracic pain  EPOC: 21  Precautions: NA  CO-MORBIDITIES: NA  HEP ACCESS CODE: [id number]         Treatment Diary  7/14 7/16 7/19 8/3     Manual Therapy         Thoracic SNAGs Rotate  3 min Rotate  3 min Rotate  3 min      Thoracic Mobs Prone   5 min Prone  5 min Prone  5 min                                                   Therapeutic Exercise  HEP         UBE  2'/2' 2'/2' 2'/2' 2'/2'               Repeated Thoracic Ext 7/7 15x 15x  15x  15x     Repeated Cervical Ext 7/7 15x  15x  15x 15x     S/L Thoracic Rotation Stretch 7/7 15x ea 15x ea 15x ea 15x ea     1/2 Kneeling Thoracic Rotation Stretch 7/7 15x ea 15x ea 15x ea                Prone Sh' 4 Way 7/7 Row and Ext  3# 15x  ABD and Y  2# 15x Row and Ext  3# 15x  ABD and Y  2# 15x Row and Ext  4# 15x  ABD and Y  3# 15x Row and Ext  4# 15x  ABD and Y  3# 15x               Thoracic Foam Rolling   2 min                  Neuromuscular Reeducation          1/2 Kneeling Chop and Lift with PA   10x ea 15x ea      TB Sh' PNF D1 Flex 7/14 OTB  10x ea OTB  15x ea OTB  15x ea OTB  15x ea     TB Sh' PNF D2 Flex 7/14 OTB  10x ea OTB  15x ea OTB  15x ea OTB  15x ea     TB Sh' PNF D1 Ext 7/14 GTB  10x ea GTB  15x ea GTB  15x ea GTB  15x     TB Sh' PNF D2 Ext 7/14 GTB  10x ea GTB  15x ea GTB  15x ea GTB  15x               TB Sh' Reach at [facility] 7/14  3 Way  OTB  5x ea 3 Way  OTB  5x ea 3 Way  OTB  5x ea                                             Therapeutic Activity                              Gait Training                                        Modalities

## 2021-09-26 ENCOUNTER — OFFICE VISIT (OUTPATIENT)
Dept: URGENT CARE | Facility: CLINIC | Age: 61
End: 2021-09-26
Payer: COMMERCIAL

## 2021-09-26 VITALS
WEIGHT: 135.4 LBS | SYSTOLIC BLOOD PRESSURE: 153 MMHG | HEART RATE: 69 BPM | RESPIRATION RATE: 16 BRPM | TEMPERATURE: 97.6 F | DIASTOLIC BLOOD PRESSURE: 70 MMHG | OXYGEN SATURATION: 99 % | BODY MASS INDEX: 23.99 KG/M2

## 2021-09-26 DIAGNOSIS — S61.011A LACERATION OF SKIN OF RIGHT THUMB, INITIAL ENCOUNTER: Primary | ICD-10-CM

## 2021-09-26 PROCEDURE — 12001 RPR S/N/AX/GEN/TRNK 2.5CM/<: CPT | Performed by: PHYSICIAN ASSISTANT

## 2021-09-26 PROCEDURE — G0382 LEV 3 HOSP TYPE B ED VISIT: HCPCS | Performed by: PHYSICIAN ASSISTANT

## 2021-10-01 ENCOUNTER — CLINICAL SUPPORT (OUTPATIENT)
Dept: FAMILY MEDICINE CLINIC | Facility: HOSPITAL | Age: 61
End: 2021-10-01
Payer: COMMERCIAL

## 2021-10-01 DIAGNOSIS — Z23 ENCOUNTER FOR IMMUNIZATION: Primary | ICD-10-CM

## 2021-10-01 PROCEDURE — 90682 RIV4 VACC RECOMBINANT DNA IM: CPT

## 2021-10-01 PROCEDURE — 90471 IMMUNIZATION ADMIN: CPT

## 2021-10-26 ENCOUNTER — TELEPHONE (OUTPATIENT)
Dept: GYNECOLOGY | Facility: CLINIC | Age: 61
End: 2021-10-26

## 2021-10-26 DIAGNOSIS — Z12.31 ENCOUNTER FOR SCREENING MAMMOGRAM FOR MALIGNANT NEOPLASM OF BREAST: Primary | ICD-10-CM

## 2021-11-10 ENCOUNTER — DOCUMENTATION (OUTPATIENT)
Dept: GYNECOLOGY | Facility: CLINIC | Age: 61
End: 2021-11-10

## 2022-01-12 NOTE — PROGRESS NOTES
Assessment/Plan:    Recommended monthly SBE, annual CBE and annual screening mammo  ASCCP guidelines reviewed and pap with cotesting noted to be up to date; this low risk patient was advised she meets criteria to d/c pap screening at age 74  No pap done  Colonoscopy noted to be up to date  Script given for DXA with instructions to check insurance company for coverage  Reviewed diet/activity recommendations Calcium 3513-2897 mg and Vit D 600-1000 IU daily  Discussed postmenopausal considerations and symptoms to report  Will continue vaginal estrogen 0 5 gm 2x per week  Kegel exercises as instructed  RTO in one year for routine annual gyn exam or sooner PRN  Diagnoses and all orders for this visit:    Encounter for gynecological examination (general) (routine) without abnormal findings    Osteoporosis screening  -     DXA bone density spine hip and pelvis; Future    Menopause  -     DXA bone density spine hip and pelvis; Future    Vaginal atrophy  -     estradiol (ESTRACE) 0 1 mg/g vaginal cream; Insert 0 5 g into the vagina 2 (two) times a week        Subjective:      Patient ID: Angela Cantu is a 63 y o  female  This patient presents for routine annual gyn exam   Dyspareunia is improving with the use of vaginal estrogen cream which she is using about once a week  She denies  bleeding or spotting, VM sx, pelvic pain,  breast concerns, abnormal discharge, bowel/bladder dysfunction, depression/anx  , sexually active and is monogamous  Pap/HPV up to date and normal, 8/20/20  Mammography normal, 8/24/20, scheduled  Colonoscopy up to date, 7/2/19  Patient has a sister and Tabby Yeboah with hx of breast cancer and father with hx of colon cancer  Patient is BRCA neg          The following portions of the patient's history were reviewed and updated as appropriate: allergies, current medications, past family history, past medical history, past social history, past surgical history and problem list     Review of Systems   Constitutional: Negative  Respiratory: Negative  Cardiovascular: Negative  Gastrointestinal: Negative  Endocrine: Negative  Genitourinary: Negative for dysuria, frequency, pelvic pain, urgency, vaginal bleeding, vaginal discharge and vaginal pain  Musculoskeletal: Negative  Skin: Negative  Neurological: Negative  Psychiatric/Behavioral: Negative  Objective:      /80 (BP Location: Left arm, Patient Position: Sitting, Cuff Size: Standard)   Pulse 87   Ht 5' 4" (1 626 m)   Wt 62 6 kg (138 lb)   LMP  (LMP Unknown)   BMI 23 69 kg/m²          Physical Exam  Vitals and nursing note reviewed  Exam conducted with a chaperone present  Constitutional:       Appearance: Normal appearance  She is well-developed  HENT:      Head: Normocephalic and atraumatic  Neck:      Thyroid: No thyroid mass or thyromegaly  Cardiovascular:      Rate and Rhythm: Normal rate and regular rhythm  Heart sounds: Normal heart sounds  Pulmonary:      Effort: Pulmonary effort is normal       Breath sounds: Normal breath sounds  Chest:   Breasts: Breasts are symmetrical       Right: No inverted nipple, mass, nipple discharge, skin change or tenderness  Left: No inverted nipple, mass, nipple discharge, skin change or tenderness  Abdominal:      General: Bowel sounds are normal       Palpations: Abdomen is soft  Tenderness: There is no abdominal tenderness  Hernia: There is no hernia in the left inguinal area or right inguinal area  Genitourinary:     General: Normal vulva  Exam position: Supine  Pubic Area: No rash  Labia:         Right: No rash, tenderness, lesion or injury  Left: No rash, tenderness, lesion or injury  Urethra: No prolapse, urethral pain, urethral swelling or urethral lesion  Vagina: Normal  No signs of injury and foreign body   No vaginal discharge, erythema, tenderness, bleeding, lesions or prolapsed vaginal walls  Cervix: No cervical motion tenderness, discharge, friability, lesion, erythema, cervical bleeding or eversion  Uterus: Not deviated, not enlarged, not fixed, not tender and no uterine prolapse  Adnexa:         Right: No mass, tenderness or fullness  Left: No mass, tenderness or fullness  Rectum: No external hemorrhoid  Comments: Urethra normal without lesions  No bladder tenderness  Musculoskeletal:         General: Normal range of motion  Cervical back: Normal range of motion and neck supple  Lymphadenopathy:      Lower Body: No right inguinal adenopathy  No left inguinal adenopathy  Skin:     General: Skin is warm and dry  Neurological:      Mental Status: She is alert and oriented to person, place, and time  Psychiatric:         Speech: Speech normal          Behavior: Behavior normal  Behavior is cooperative

## 2022-01-17 ENCOUNTER — ANNUAL EXAM (OUTPATIENT)
Dept: GYNECOLOGY | Facility: CLINIC | Age: 62
End: 2022-01-17
Payer: COMMERCIAL

## 2022-01-17 ENCOUNTER — HOSPITAL ENCOUNTER (OUTPATIENT)
Dept: MAMMOGRAPHY | Facility: CLINIC | Age: 62
Discharge: HOME | End: 2022-01-17
Payer: COMMERCIAL

## 2022-01-17 VITALS
DIASTOLIC BLOOD PRESSURE: 80 MMHG | HEART RATE: 87 BPM | WEIGHT: 138 LBS | HEIGHT: 64 IN | SYSTOLIC BLOOD PRESSURE: 120 MMHG | BODY MASS INDEX: 23.56 KG/M2

## 2022-01-17 VITALS — BODY MASS INDEX: 23.92 KG/M2 | WEIGHT: 135 LBS | HEIGHT: 63 IN

## 2022-01-17 DIAGNOSIS — Z13.820 OSTEOPOROSIS SCREENING: ICD-10-CM

## 2022-01-17 DIAGNOSIS — N95.2 VAGINAL ATROPHY: ICD-10-CM

## 2022-01-17 DIAGNOSIS — Z01.419 ENCOUNTER FOR GYNECOLOGICAL EXAMINATION (GENERAL) (ROUTINE) WITHOUT ABNORMAL FINDINGS: Primary | ICD-10-CM

## 2022-01-17 DIAGNOSIS — Z78.0 MENOPAUSE: ICD-10-CM

## 2022-01-17 DIAGNOSIS — Z12.31 ENCOUNTER FOR SCREENING MAMMOGRAM FOR MALIGNANT NEOPLASM OF BREAST: ICD-10-CM

## 2022-01-17 PROCEDURE — 3008F BODY MASS INDEX DOCD: CPT | Performed by: OBSTETRICS & GYNECOLOGY

## 2022-01-17 PROCEDURE — 1036F TOBACCO NON-USER: CPT | Performed by: OBSTETRICS & GYNECOLOGY

## 2022-01-17 PROCEDURE — 77063 BREAST TOMOSYNTHESIS BI: CPT

## 2022-01-17 PROCEDURE — 99396 PREV VISIT EST AGE 40-64: CPT | Performed by: OBSTETRICS & GYNECOLOGY

## 2022-01-17 PROCEDURE — 77067 SCR MAMMO BI INCL CAD: CPT

## 2022-01-17 RX ORDER — ESTRADIOL 0.1 MG/G
0.5 CREAM VAGINAL 2 TIMES WEEKLY
Qty: 42.5 G | Refills: 2 | Status: SHIPPED | OUTPATIENT
Start: 2022-01-17

## 2022-01-17 RX ORDER — CHOLECALCIFEROL (VITAMIN D3) 125 MCG
CAPSULE ORAL
COMMUNITY

## 2022-05-19 DIAGNOSIS — B34.9 VIRAL INFECTION, UNSPECIFIED: Primary | ICD-10-CM

## 2022-05-19 PROCEDURE — 87636 SARSCOV2 & INF A&B AMP PRB: CPT | Performed by: FAMILY MEDICINE

## 2022-05-20 DIAGNOSIS — B96.89 ACUTE BACTERIAL PHARYNGITIS: Primary | ICD-10-CM

## 2022-05-20 DIAGNOSIS — J02.8 ACUTE BACTERIAL PHARYNGITIS: Primary | ICD-10-CM

## 2022-05-20 LAB
FLUAV RNA RESP QL NAA+PROBE: NEGATIVE
FLUBV RNA RESP QL NAA+PROBE: NEGATIVE
SARS-COV-2 RNA RESP QL NAA+PROBE: NEGATIVE

## 2022-05-20 RX ORDER — AZITHROMYCIN 250 MG/1
TABLET, FILM COATED ORAL
Qty: 6 TABLET | Refills: 0 | Status: SHIPPED | OUTPATIENT
Start: 2022-05-20 | End: 2022-05-24

## 2022-05-23 DIAGNOSIS — J20.9 BRONCHITIS WITH BRONCHOSPASM: Primary | ICD-10-CM

## 2022-05-23 RX ORDER — PREDNISONE 10 MG/1
TABLET ORAL
Qty: 16 TABLET | Refills: 0 | Status: SHIPPED | OUTPATIENT
Start: 2022-05-23 | End: 2023-04-26 | Stop reason: SDUPTHER

## 2022-07-13 DIAGNOSIS — B35.1 TINEA UNGUIUM: Primary | ICD-10-CM

## 2022-07-21 DIAGNOSIS — J40 BRONCHITIS: Primary | ICD-10-CM

## 2022-07-21 RX ORDER — AZITHROMYCIN 250 MG/1
TABLET, FILM COATED ORAL
Qty: 6 TABLET | Refills: 0 | Status: SHIPPED | OUTPATIENT
Start: 2022-07-21 | End: 2022-07-25

## 2022-08-02 DIAGNOSIS — B96.89 PROTRACTED BACTERIAL BRONCHITIS: Primary | ICD-10-CM

## 2022-08-02 DIAGNOSIS — J42 PROTRACTED BACTERIAL BRONCHITIS: Primary | ICD-10-CM

## 2022-08-02 RX ORDER — AZITHROMYCIN 250 MG/1
TABLET, FILM COATED ORAL
Qty: 6 TABLET | Refills: 0 | Status: SHIPPED | OUTPATIENT
Start: 2022-08-02 | End: 2022-08-06

## 2022-08-03 DIAGNOSIS — R05.9 COUGH: Primary | ICD-10-CM

## 2022-08-03 RX ORDER — BENZONATATE 200 MG/1
200 CAPSULE ORAL 3 TIMES DAILY PRN
Qty: 30 CAPSULE | Refills: 2 | Status: SHIPPED | OUTPATIENT
Start: 2022-08-03

## 2022-08-09 DIAGNOSIS — J01.01 ACUTE RECURRENT MAXILLARY SINUSITIS: Primary | ICD-10-CM

## 2022-08-09 RX ORDER — AZITHROMYCIN 250 MG/1
TABLET, FILM COATED ORAL
Qty: 6 TABLET | Refills: 0 | Status: SHIPPED | OUTPATIENT
Start: 2022-08-09 | End: 2022-08-13

## 2022-10-15 DIAGNOSIS — K04.7 DENTAL INFECTION: Primary | ICD-10-CM

## 2022-10-15 RX ORDER — AMOXICILLIN 500 MG/1
500 CAPSULE ORAL EVERY 8 HOURS SCHEDULED
Qty: 30 CAPSULE | Refills: 0 | Status: SHIPPED | OUTPATIENT
Start: 2022-10-15 | End: 2022-10-25

## 2022-11-16 DIAGNOSIS — J45.20 MILD INTERMITTENT REACTIVE AIRWAY DISEASE WITHOUT COMPLICATION: ICD-10-CM

## 2022-11-16 RX ORDER — ALBUTEROL SULFATE 90 UG/1
2 AEROSOL, METERED RESPIRATORY (INHALATION) EVERY 6 HOURS PRN
Qty: 18 G | Refills: 5 | Status: SHIPPED | OUTPATIENT
Start: 2022-11-16

## 2022-12-29 DIAGNOSIS — Z13.820 OSTEOPOROSIS SCREENING: ICD-10-CM

## 2022-12-29 DIAGNOSIS — Z78.0 MENOPAUSE: ICD-10-CM

## 2022-12-29 DIAGNOSIS — Z12.31 ENCOUNTER FOR SCREENING MAMMOGRAM FOR MALIGNANT NEOPLASM OF BREAST: Primary | ICD-10-CM

## 2023-01-06 DIAGNOSIS — H10.9 BACTERIAL CONJUNCTIVITIS OF BOTH EYES: Primary | ICD-10-CM

## 2023-01-06 DIAGNOSIS — B96.89 BACTERIAL CONJUNCTIVITIS OF BOTH EYES: Primary | ICD-10-CM

## 2023-01-06 DIAGNOSIS — B30.8 CHRONIC VIRAL CONJUNCTIVITIS OF BOTH EYES: ICD-10-CM

## 2023-01-06 RX ORDER — POLYMYXIN B SULFATE AND TRIMETHOPRIM 1; 10000 MG/ML; [USP'U]/ML
1 SOLUTION OPHTHALMIC EVERY 4 HOURS
Qty: 10 ML | Refills: 0 | Status: SHIPPED | OUTPATIENT
Start: 2023-01-06

## 2023-01-06 RX ORDER — POLYMYXIN B SULFATE AND TRIMETHOPRIM 1; 10000 MG/ML; [USP'U]/ML
1 SOLUTION OPHTHALMIC EVERY 4 HOURS
Qty: 10 ML | Refills: 0 | Status: SHIPPED | OUTPATIENT
Start: 2023-01-06 | End: 2023-01-06 | Stop reason: SDUPTHER

## 2023-02-08 ENCOUNTER — HOSPITAL ENCOUNTER (OUTPATIENT)
Dept: BONE DENSITY | Facility: CLINIC | Age: 63
Discharge: HOME | End: 2023-02-08

## 2023-02-08 ENCOUNTER — HOSPITAL ENCOUNTER (OUTPATIENT)
Dept: MAMMOGRAPHY | Facility: CLINIC | Age: 63
Discharge: HOME | End: 2023-02-08

## 2023-02-08 VITALS — BODY MASS INDEX: 24.49 KG/M2 | HEIGHT: 63 IN | WEIGHT: 138.2 LBS

## 2023-02-08 VITALS — WEIGHT: 138.2 LBS | HEIGHT: 63 IN | BODY MASS INDEX: 24.49 KG/M2

## 2023-02-08 DIAGNOSIS — Z78.0 MENOPAUSE: ICD-10-CM

## 2023-02-08 DIAGNOSIS — Z12.31 ENCOUNTER FOR SCREENING MAMMOGRAM FOR MALIGNANT NEOPLASM OF BREAST: ICD-10-CM

## 2023-02-08 DIAGNOSIS — Z13.820 OSTEOPOROSIS SCREENING: ICD-10-CM

## 2023-03-09 DIAGNOSIS — J20.9 ACUTE BRONCHITIS WITH BRONCHOSPASM: Primary | ICD-10-CM

## 2023-03-09 RX ORDER — AZITHROMYCIN 250 MG/1
TABLET, FILM COATED ORAL
Qty: 6 TABLET | Refills: 0 | Status: SHIPPED | OUTPATIENT
Start: 2023-03-09 | End: 2023-03-13

## 2023-04-26 DIAGNOSIS — M53.87 SCIATICA ASSOCIATED WITH DISORDER OF LUMBOSACRAL SPINE: Primary | ICD-10-CM

## 2023-04-26 DIAGNOSIS — J20.9 BRONCHITIS WITH BRONCHOSPASM: ICD-10-CM

## 2023-04-26 RX ORDER — PREDNISONE 10 MG/1
TABLET ORAL
Qty: 16 TABLET | Refills: 2 | Status: SHIPPED | OUTPATIENT
Start: 2023-04-26

## 2023-04-26 RX ORDER — METHOCARBAMOL 750 MG/1
750 TABLET, FILM COATED ORAL EVERY 6 HOURS PRN
Qty: 30 TABLET | Refills: 2 | Status: SHIPPED | OUTPATIENT
Start: 2023-04-26

## 2023-06-01 DIAGNOSIS — N95.2 VAGINAL ATROPHY: ICD-10-CM

## 2023-06-01 RX ORDER — ESTRADIOL 0.1 MG/G
0.5 CREAM VAGINAL 2 TIMES WEEKLY
Qty: 42.5 G | Refills: 2 | Status: SHIPPED | OUTPATIENT
Start: 2023-06-01

## 2023-06-02 DIAGNOSIS — K21.00 GASTROESOPHAGEAL REFLUX DISEASE WITH ESOPHAGITIS WITHOUT HEMORRHAGE: Primary | ICD-10-CM

## 2023-06-02 RX ORDER — PANTOPRAZOLE SODIUM 40 MG/1
40 TABLET, DELAYED RELEASE ORAL
Qty: 30 TABLET | Refills: 1 | Status: SHIPPED | OUTPATIENT
Start: 2023-06-02 | End: 2023-06-02 | Stop reason: SDUPTHER

## 2023-06-02 RX ORDER — PANTOPRAZOLE SODIUM 40 MG/1
40 TABLET, DELAYED RELEASE ORAL
Qty: 30 TABLET | Refills: 1 | Status: SHIPPED | OUTPATIENT
Start: 2023-06-02 | End: 2023-11-29

## 2023-07-17 DIAGNOSIS — L57.8 SOLAR ELASTOSIS: Primary | ICD-10-CM

## 2023-08-23 DIAGNOSIS — J45.21 MILD INTERMITTENT ASTHMA WITH ACUTE EXACERBATION: Primary | ICD-10-CM

## 2023-08-23 RX ORDER — FLUTICASONE PROPIONATE 110 UG/1
2 AEROSOL, METERED RESPIRATORY (INHALATION) 2 TIMES DAILY
Qty: 12 G | Refills: 3 | Status: SHIPPED | OUTPATIENT
Start: 2023-08-23

## 2023-11-30 DIAGNOSIS — K21.00 GASTROESOPHAGEAL REFLUX DISEASE WITH ESOPHAGITIS WITHOUT HEMORRHAGE: ICD-10-CM

## 2023-11-30 RX ORDER — PANTOPRAZOLE SODIUM 40 MG/1
TABLET, DELAYED RELEASE ORAL
Qty: 30 TABLET | Refills: 1 | Status: SHIPPED | OUTPATIENT
Start: 2023-11-30

## 2024-01-03 ENCOUNTER — OFFICE VISIT (OUTPATIENT)
Dept: FAMILY MEDICINE CLINIC | Facility: HOSPITAL | Age: 64
End: 2024-01-03
Payer: COMMERCIAL

## 2024-01-03 VITALS
BODY MASS INDEX: 24.1 KG/M2 | OXYGEN SATURATION: 98 % | HEART RATE: 79 BPM | SYSTOLIC BLOOD PRESSURE: 126 MMHG | HEIGHT: 63 IN | DIASTOLIC BLOOD PRESSURE: 68 MMHG | WEIGHT: 136 LBS

## 2024-01-03 DIAGNOSIS — Z12.31 ENCOUNTER FOR SCREENING MAMMOGRAM FOR MALIGNANT NEOPLASM OF BREAST: ICD-10-CM

## 2024-01-03 DIAGNOSIS — R07.89 CHEST PRESSURE: ICD-10-CM

## 2024-01-03 DIAGNOSIS — Z00.00 ANNUAL PHYSICAL EXAM: Primary | ICD-10-CM

## 2024-01-03 DIAGNOSIS — M54.6 THORACIC SPINE PAIN: ICD-10-CM

## 2024-01-03 DIAGNOSIS — E78.00 ELEVATED LDL CHOLESTEROL LEVEL: ICD-10-CM

## 2024-01-03 DIAGNOSIS — K21.00 GASTROESOPHAGEAL REFLUX DISEASE WITH ESOPHAGITIS WITHOUT HEMORRHAGE: ICD-10-CM

## 2024-01-03 PROCEDURE — 99396 PREV VISIT EST AGE 40-64: CPT | Performed by: INTERNAL MEDICINE

## 2024-01-03 NOTE — PROGRESS NOTES
ADULT ANNUAL PHYSICAL  [facility] [address]    NAME: Dee Luis  AGE: 63 y.o. SEX: female  : 1960     DATE: 2024     Assessment and Plan:     Problem List Items Addressed This Visit    None  Visit Diagnoses       Annual physical exam    -  Primary    Relevant Orders    Ambulatory Referral to Cardiology    Encounter for screening mammogram for malignant neoplasm of breast        Relevant Orders    Mammo screening bilateral w 3d & cad    Elevated LDL cholesterol level        Relevant Orders    Lipid Panel with Direct LDL reflex    Comprehensive metabolic panel    Ambulatory Referral to Cardiology    Gastroesophageal reflux disease with esophagitis without hemorrhage        Relevant Orders    CBC and differential    Chest pressure        Relevant Orders    NM myocardial perfusion spect (rx stress and/or rest)    XR chest pa & lateral    Ambulatory Referral to Cardiology    POCT ECG (Completed)    Thoracic spine pain        Relevant Orders    XR chest pa & lateral            Immunizations and preventive care screenings were discussed with patient today. Appropriate education was printed on patient's after visit summary.    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 They screened negative with a PHQ-2 score of 0.        Return in about 1 year (around 1/3/2025).     Chief Complaint:     Chief Complaint   Patient presents with    Physical Exam      History of Present Illness:     Adult Annual Physical   Patient here for a comprehensive physical exam. The patient reports problems - chest pressure episodes- family hx cardiac dx .  Father had colon cancer - will be due this July for 5 year follow up    Diet and Physical Activity  Diet/Nutrition: well balanced diet.   Exercise: walking.       Depression Screening  PHQ-2/9 Depression Screening    Little interest or pleasure in doing things: 0 - not at all  Feeling down, depressed, or hopeless: 0 - not at all  PHQ-2 Score: 0  PHQ-2 Interpretation: Negative depression screen       General Health  Sleep: sleeps well.   Hearing: normal - bilateral.  Vision: most recent eye exam <1 year ago.   Dental: regular dental visits.       /GYN Health  Follows with gynecology? yes   Patient is: postmenopausal     Advanced Care Planning  Do you have an advanced directive? yes  Do you have a durable medical power of ? Yes  To bring in copy     Review of Systems:     Review of Systems   Constitutional:  Negative for activity change and fatigue.   HENT:  Negative for congestion.    Respiratory:  Negative for cough and choking.    Cardiovascular:  Positive for chest pain.        Has intermittent chest pain once weekly- odd feeling in head- take a deep breath at times  Had been in ed in 2019   Chest heaviness can last an hour or so   Musculoskeletal:  Positive for back pain.        In past few years ahs some mid back pain- did PT-has exercises to do   May have pain  if on her feet  then has mucsle spasms and pain that goes nateriorally   Prior xray in 2021- had mild lower scoliosis   Neurological:  Negative for tremors and weakness.   All other systems reviewed and are negative.     Past Medical History:     Past Medical History:   Diagnosis Date    BRCA1 negative     done thru [facility] web site    BRCA2 negative     Hemorrhoids     Mitral valve prolapse     MVP (mitral valve prolapse)       Past Surgical History:     Past Surgical History:   Procedure Laterality Date    CARDIAC CATHETERIZATION      COLONOSCOPY  07/02/2019    GANGLION CYST EXCISION Right     wrist    OOPHORECTOMY Right 2003    Age 42?    MO HEMORRHOIDECTOMY INT & XTRNL 2/> COLUMN/DONTRELL N/A 01/10/2017    Procedure: INTERNAL AND EXTERNAL HEMORRHOIDECTOMY ;  Surgeon: Shruthi Car MD;   Location: [facility];  Service: Colorectal      Social History:     Social History     Socioeconomic History    Marital status: /Civil Union     Spouse name: None    Number of children: None    Years of education: None    Highest education level: None   Occupational History    None   Tobacco Use    Smoking status: Never    Smokeless tobacco: Never   Vaping Use    Vaping status: Never Used   Substance and Sexual Activity    Alcohol use: Yes    Drug use: No    Sexual activity: Yes     Partners: Male     Birth control/protection: Post-menopausal   Other Topics Concern    None   Social History Narrative    Wears seatbelt    Regular dental care     Regular exercise      Social Determinants of Health     Financial Resource Strain: Not on file   Food Insecurity: Not on file   Transportation Needs: Not on file   Physical Activity: Not on file   Stress: Not on file   Social Connections: Not on file   Intimate Partner Violence: Not on file   Housing Stability: Not on file      Family History:     Family History   Problem Relation Age of Onset    Melanoma Mother     Hypertension Mother     Skin cancer Mother         Malignant melanoma in eye- 30's, second time malignant melanoma in lungs-72    Colon cancer Father 60    Pulmonary fibrosis Father     Breast cancer Sister 63    No Known Problems Sister     Stroke Daughter     No Known Problems Daughter     No Known Problems Maternal Grandmother     No Known Problems Maternal Grandfather     No Known Problems Paternal Grandmother     No Known Problems Paternal Grandfather     Cancer Son     Hodgkin's lymphoma Son 6        Non hodgkins lymphoma    No Known Problems Maternal Aunt     Breast cancer Maternal Aunt 80    No Known Problems Maternal Aunt     No Known Problems Paternal Aunt     No Known Problems Paternal Aunt       Current Medications:     Current Outpatient Medications   Medication Sig Dispense Refill    albuterol (Ventolin HFA) 90 mcg/act inhaler Inhale 2 puffs  "every 6 (six) hours as needed for wheezing 18 g 5    ALPRAZolam (XANAX) 0.5 mg tablet Take 1 tablet every 8 hours as needed for anxiety 20 tablet 0    Cholecalciferol (VITAMIN D3 GUMMIES ADULT PO) Take by mouth      estradiol (ESTRACE) 0.1 mg/g vaginal cream Insert 0.5 g into the vagina 2 (two) times a week 42.5 g 2    methocarbamol (Robaxin-750) 750 mg tablet Take 1 tablet (750 mg total) by mouth every 6 (six) hours as needed for muscle spasms 30 tablet 2    Multiple Vitamin (MULTIVITAMIN) tablet Take 1 tablet by mouth daily      Multiple Vitamins-Minerals (EMERGEN-C IMMUNE PO) Take by mouth      Multiple Vitamins-Minerals (HAIR/SKIN/NAILS) TABS Take by mouth daily      ondansetron (Zofran ODT) 8 mg disintegrating tablet Take 1 tablet (8 mg total) by mouth every 8 (eight) hours as needed for nausea or vomiting 30 tablet 0    pantoprazole (PROTONIX) 40 mg tablet take 1 tablet by mouth once daily before breakfast 30 tablet 1    tretinoin (RETIN-A) 0.025 % cream Apply topically daily at bedtime 45 g 2    benzonatate (TESSALON) 200 MG capsule Take 1 capsule (200 mg total) by mouth 3 (three) times a day as needed for cough 30 capsule 2    Cholecalciferol (Vitamin D) 125 MCG (5000 UT) CAPS Take by mouth      polymyxin b-trimethoprim (POLYTRIM) ophthalmic solution Administer 1 drop to both eyes every 4 (four) hours 10 mL 0    predniSONE 10 mg tablet Take 4 tablets today, then three tablets daily for 2 days, two tablets daily for 2 days, then one tablet daily for 2 days. 16 tablet 2    tolnaftate (TINACTIN) 1 % external solution Apply topically daily at bedtime (Patient not taking: Reported on 1/3/2024) 10 mL 2     No current facility-administered medications for this visit.      Allergies:     No Known Allergies   Physical Exam:     /68   Pulse 79   Ht 5' 3\" (1.6 m)   Wt 61.7 kg (136 lb)   LMP  (LMP Unknown)   SpO2 98%   BMI 24.09 kg/m²     Physical Exam  Vitals and nursing note reviewed.   Constitutional:     "   General: She is not in acute distress.     Appearance: She is well-developed.   HENT:      Head: Normocephalic and atraumatic.      Nose: No congestion.      Mouth/Throat:      Mouth: Mucous membranes are dry.   Eyes:      Conjunctiva/sclera: Conjunctivae normal.   Cardiovascular:      Rate and Rhythm: Normal rate and regular rhythm.      Heart sounds: No murmur heard.     Comments: No chest wall tenderness  Pulmonary:      Effort: Pulmonary effort is normal. No respiratory distress.      Breath sounds: Normal breath sounds.   Abdominal:      Palpations: Abdomen is soft.      Tenderness: There is no abdominal tenderness.   Musculoskeletal:         General: No swelling.      Cervical back: Neck supple.   Skin:     General: Skin is warm and dry.      Capillary Refill: Capillary refill takes less than 2 seconds.   Neurological:      General: No focal deficit present.      Mental Status: She is alert.      Motor: No weakness.   Psychiatric:         Mood and Affect: Mood normal.          Estella Orozco DO  [facility] [address]

## 2024-01-04 LAB — VENTRICULAR RATE: 70 DEGREES

## 2024-01-04 PROCEDURE — 93000 ELECTROCARDIOGRAM COMPLETE: CPT | Performed by: INTERNAL MEDICINE

## 2024-01-09 ENCOUNTER — APPOINTMENT (OUTPATIENT)
Dept: LAB | Facility: HOSPITAL | Age: 64
End: 2024-01-09
Payer: COMMERCIAL

## 2024-01-09 DIAGNOSIS — E78.00 HYPERCHOLESTEREMIA: ICD-10-CM

## 2024-01-09 DIAGNOSIS — K21.00 PEPTIC REFLUX ESOPHAGITIS: ICD-10-CM

## 2024-01-09 LAB
ALBUMIN SERPL BCP-MCNC: 4.4 G/DL (ref 3.5–5)
ALP SERPL-CCNC: 53 U/L (ref 34–104)
ALT SERPL W P-5'-P-CCNC: 15 U/L (ref 7–52)
ANION GAP SERPL CALCULATED.3IONS-SCNC: 9 MMOL/L
AST SERPL W P-5'-P-CCNC: 19 U/L (ref 13–39)
BASOPHILS # BLD AUTO: 0.05 THOUSANDS/ÂΜL (ref 0–0.1)
BASOPHILS NFR BLD AUTO: 1 % (ref 0–1)
BILIRUB SERPL-MCNC: 0.37 MG/DL (ref 0.2–1)
BUN SERPL-MCNC: 16 MG/DL (ref 5–25)
CALCIUM SERPL-MCNC: 9.2 MG/DL (ref 8.4–10.2)
CHLORIDE SERPL-SCNC: 104 MMOL/L (ref 96–108)
CHOLEST SERPL-MCNC: 179 MG/DL
CO2 SERPL-SCNC: 27 MMOL/L (ref 21–32)
CREAT SERPL-MCNC: 0.64 MG/DL (ref 0.6–1.3)
EOSINOPHIL # BLD AUTO: 0.14 THOUSAND/ÂΜL (ref 0–0.61)
EOSINOPHIL NFR BLD AUTO: 3 % (ref 0–6)
ERYTHROCYTE [DISTWIDTH] IN BLOOD BY AUTOMATED COUNT: 12 % (ref 11.6–15.1)
GFR SERPL CREATININE-BSD FRML MDRD: 96 ML/MIN/1.73SQ M
GLUCOSE P FAST SERPL-MCNC: 87 MG/DL (ref 65–99)
HCT VFR BLD AUTO: 40.8 % (ref 34.8–46.1)
HDLC SERPL-MCNC: 60 MG/DL
HGB BLD-MCNC: 13 G/DL (ref 11.5–15.4)
IMM GRANULOCYTES # BLD AUTO: 0.01 THOUSAND/UL (ref 0–0.2)
IMM GRANULOCYTES NFR BLD AUTO: 0 % (ref 0–2)
LDLC SERPL CALC-MCNC: 101 MG/DL (ref 0–100)
LYMPHOCYTES # BLD AUTO: 1.29 THOUSANDS/ÂΜL (ref 0.6–4.47)
LYMPHOCYTES NFR BLD AUTO: 29 % (ref 14–44)
MCH RBC QN AUTO: 28.9 PG (ref 26.8–34.3)
MCHC RBC AUTO-ENTMCNC: 31.9 G/DL (ref 31.4–37.4)
MCV RBC AUTO: 91 FL (ref 82–98)
MONOCYTES # BLD AUTO: 0.38 THOUSAND/ÂΜL (ref 0.17–1.22)
MONOCYTES NFR BLD AUTO: 9 % (ref 4–12)
NEUTROPHILS # BLD AUTO: 2.61 THOUSANDS/ÂΜL (ref 1.85–7.62)
NEUTS SEG NFR BLD AUTO: 58 % (ref 43–75)
NRBC BLD AUTO-RTO: 0 /100 WBCS
PLATELET # BLD AUTO: 259 THOUSANDS/UL (ref 149–390)
PMV BLD AUTO: 10.3 FL (ref 8.9–12.7)
POTASSIUM SERPL-SCNC: 3.7 MMOL/L (ref 3.5–5.3)
PROT SERPL-MCNC: 6.5 G/DL (ref 6.4–8.4)
RBC # BLD AUTO: 4.5 MILLION/UL (ref 3.81–5.12)
SODIUM SERPL-SCNC: 140 MMOL/L (ref 135–147)
TRIGL SERPL-MCNC: 92 MG/DL
WBC # BLD AUTO: 4.48 THOUSAND/UL (ref 4.31–10.16)

## 2024-01-09 PROCEDURE — 85025 COMPLETE CBC W/AUTO DIFF WBC: CPT

## 2024-01-09 PROCEDURE — 80053 COMPREHEN METABOLIC PANEL: CPT

## 2024-01-09 PROCEDURE — 80061 LIPID PANEL: CPT

## 2024-01-09 PROCEDURE — 36415 COLL VENOUS BLD VENIPUNCTURE: CPT

## 2024-01-22 ENCOUNTER — HOSPITAL ENCOUNTER (OUTPATIENT)
Dept: NON INVASIVE DIAGNOSTICS | Facility: HOSPITAL | Age: 64
Discharge: HOME | End: 2024-01-22
Attending: INTERNAL MEDICINE
Payer: COMMERCIAL

## 2024-01-22 ENCOUNTER — HOSPITAL ENCOUNTER (OUTPATIENT)
Dept: NUCLEAR MEDICINE | Facility: HOSPITAL | Age: 64
Discharge: HOME | End: 2024-01-22
Attending: INTERNAL MEDICINE

## 2024-01-22 ENCOUNTER — HOSPITAL ENCOUNTER (OUTPATIENT)
Dept: NUCLEAR MEDICINE | Facility: HOSPITAL | Age: 64
Discharge: HOME | End: 2024-01-22
Attending: INTERNAL MEDICINE
Payer: COMMERCIAL

## 2024-01-22 DIAGNOSIS — R07.89 CHEST PRESSURE: ICD-10-CM

## 2024-01-22 LAB
CHEST PAIN STATEMENT: NORMAL
MAX DIASTOLIC BP: 70 MMHG
MAX DIASTOLIC BP: 88 MMHG
MAX HR PERCENT: 110 %
MAX HR: 176 BPM
MAX PREDICTED HEART RATE: 159 BPM
MAX PREDICTED HEART RATE: 159 BPM
NUC STRESS EJECTION FRACTION: 70 %
PROTOCOL NAME: NORMAL
PROTOCOL NAME: NORMAL
RATE PRESSURE PRODUCT: NORMAL
REASON FOR TERMINATION: NORMAL
SL CV REST NUCLEAR ISOTOPE DOSE: 10.2 MCI
SL CV STRESS NUCLEAR ISOTOPE DOSE: 30.5 MCI
SL CV STRESS STAGE REACHED: 4
STRESS ANGINA INDEX: 0
STRESS BASELINE HR: 85 BPM
STRESS DUKE TREADMILL SCORE: 7
STRESS PEAK HR: 176 BPM
STRESS POST ESTIMATED WORKLOAD: 13.4 METS
STRESS POST EXERCISE DUR MIN: 0 MIN
STRESS POST EXERCISE DUR MIN: 12 MIN
STRESS POST EXERCISE DUR MIN: 12 MIN
STRESS POST EXERCISE DUR SEC: 0 SEC
STRESS POST PEAK BP: 168 MMHG
STRESS POST PEAK HR: 176 BPM
STRESS POST PEAK HR: 88 BPM
STRESS POST PEAK SYSTOLIC BP: 120 MMHG
STRESS POST PEAK SYSTOLIC BP: 168 MMHG
STRESS ST DEPRESSION: 1 MM
STRESS/REST PERFUSION RATIO: 0.83
TARGET HR FORMULA: NORMAL
TARGET HR FORMULA: NORMAL
TEST INDICATION: NORMAL
TEST INDICATION: NORMAL

## 2024-01-22 PROCEDURE — 78452 HT MUSCLE IMAGE SPECT MULT: CPT

## 2024-01-22 PROCEDURE — 78452 HT MUSCLE IMAGE SPECT MULT: CPT | Performed by: INTERNAL MEDICINE

## 2024-01-22 PROCEDURE — 93017 CV STRESS TEST TRACING ONLY: CPT

## 2024-01-22 PROCEDURE — G1004 CDSM NDSC: HCPCS

## 2024-01-22 PROCEDURE — 93018 CV STRESS TEST I&R ONLY: CPT | Performed by: INTERNAL MEDICINE

## 2024-01-22 PROCEDURE — 93016 CV STRESS TEST SUPVJ ONLY: CPT | Performed by: INTERNAL MEDICINE

## 2024-01-22 PROCEDURE — A9502 TC99M TETROFOSMIN: HCPCS

## 2024-01-22 RX ORDER — REGADENOSON 0.08 MG/ML
0.4 INJECTION, SOLUTION INTRAVENOUS ONCE
Status: DISCONTINUED | OUTPATIENT
Start: 2024-01-22 | End: 2024-01-23 | Stop reason: HOSPADM

## 2024-01-31 DIAGNOSIS — K21.00 GASTROESOPHAGEAL REFLUX DISEASE WITH ESOPHAGITIS WITHOUT HEMORRHAGE: ICD-10-CM

## 2024-01-31 RX ORDER — PANTOPRAZOLE SODIUM 40 MG/1
TABLET, DELAYED RELEASE ORAL
Qty: 30 TABLET | Refills: 1 | Status: SHIPPED | OUTPATIENT
Start: 2024-01-31

## 2024-02-10 ENCOUNTER — HOSPITAL ENCOUNTER (OUTPATIENT)
Dept: MAMMOGRAPHY | Facility: CLINIC | Age: 64
Discharge: HOME | End: 2024-02-10
Payer: COMMERCIAL

## 2024-02-10 ENCOUNTER — APPOINTMENT (OUTPATIENT)
Dept: RADIOLOGY | Facility: CLINIC | Age: 64
End: 2024-02-10
Payer: COMMERCIAL

## 2024-02-10 ENCOUNTER — HOSPITAL ENCOUNTER (OUTPATIENT)
Dept: RADIOLOGY | Facility: HOSPITAL | Age: 64
Discharge: HOME | End: 2024-02-10
Payer: COMMERCIAL

## 2024-02-10 VITALS — BODY MASS INDEX: 23.83 KG/M2 | HEIGHT: 63 IN | WEIGHT: 134.48 LBS

## 2024-02-10 DIAGNOSIS — Z12.31 ENCOUNTER FOR SCREENING MAMMOGRAM FOR MALIGNANT NEOPLASM OF BREAST: ICD-10-CM

## 2024-02-10 PROCEDURE — 77063 BREAST TOMOSYNTHESIS BI: CPT

## 2024-02-10 PROCEDURE — 71046 X-RAY EXAM CHEST 2 VIEWS: CPT

## 2024-02-10 PROCEDURE — 77067 SCR MAMMO BI INCL CAD: CPT

## 2024-02-19 NOTE — PROGRESS NOTES
Assessment/Plan:    Recommended monthly SBE, annual CBE and annual screening mammo. ASCCP guidelines reviewed and pap with cotesting noted to be up to date; this low risk patient was advised she meets criteria to d/c pap screening at age 67. Pap done. DEXA and colonoscopy noted to be up to date.   Discussed postmenopausal considerations and symptoms to report. Kegel exercises as instructed. RTO in one year for routine annual gyn exam or sooner PRN.        Diagnoses and all orders for this visit:    Encounter for gynecological examination (general) (routine) without abnormal findings    Screening mammogram for breast cancer  -     Mammo screening bilateral w 3d & cad; Future    Vaginal atrophy  -     estradiol (ESTRACE) 0.1 mg/g vaginal cream; Insert 0.5 g into the vagina 2 (two) times a week        Subjective:      Patient ID: Dee Luis is a 63 y.o. female.    This patient presents for routine annual gyn exam.  Dyspareunia is improving with the use of vaginal estrogen cream which she is using about twice a week.   She denies  bleeding or spotting, VM sx, pelvic pain,  breast concerns, abnormal discharge, bowel/bladder dysfunction, depression/anx.   , sexually active and is monogamous.    Pap/HPV up to date and normal, 8/20/20.  Mammography normal, 2/10/24. Colonoscopy up to date, 7/2/19. Patient has a sister and Maunt with hx of breast cancer and father with hx of colon cancer. Patient is BRCA neg. DXA 2/8/23, osteopenia.          The following portions of the patient's history were reviewed and updated as appropriate: allergies, current medications, past family history, past medical history, past social history, past surgical history and problem list.    Review of Systems   Constitutional: Negative.    Respiratory: Negative.     Cardiovascular: Negative.    Gastrointestinal: Negative.    Endocrine: Negative.    Genitourinary:  Negative for dysuria, frequency, pelvic pain, urgency, vaginal  "bleeding, vaginal discharge and vaginal pain.   Musculoskeletal: Negative.    Skin: Negative.    Neurological: Negative.    Psychiatric/Behavioral: Negative.           Objective:      /70   Pulse 78   Ht 5' 3\" (1.6 m)   Wt 62.5 kg (137 lb 12.8 oz)   LMP  (LMP Unknown)   BMI 24.41 kg/m²          Physical Exam  Vitals and nursing note reviewed. Exam conducted with a chaperone present.   Constitutional:       Appearance: Normal appearance. She is well-developed.   HENT:      Head: Normocephalic and atraumatic.   Neck:      Thyroid: No thyroid mass or thyromegaly.   Cardiovascular:      Rate and Rhythm: Normal rate and regular rhythm.      Heart sounds: Normal heart sounds.   Pulmonary:      Effort: Pulmonary effort is normal.      Breath sounds: Normal breath sounds.   Chest:   Breasts:     Breasts are symmetrical.      Right: No inverted nipple, mass, nipple discharge, skin change or tenderness.      Left: No inverted nipple, mass, nipple discharge, skin change or tenderness.   Abdominal:      General: Bowel sounds are normal.      Palpations: Abdomen is soft.      Tenderness: There is no abdominal tenderness.      Hernia: There is no hernia in the left inguinal area or right inguinal area.   Genitourinary:     General: Normal vulva.      Exam position: Supine.      Pubic Area: No rash.       Labia:         Right: No rash, tenderness, lesion or injury.         Left: No rash, tenderness, lesion or injury.       Urethra: No prolapse, urethral pain, urethral swelling or urethral lesion.      Vagina: Normal. No signs of injury and foreign body. No vaginal discharge, erythema, tenderness, bleeding, lesions or prolapsed vaginal walls.      Cervix: No cervical motion tenderness, discharge, friability, lesion, erythema, cervical bleeding or eversion.      Uterus: Not deviated, not enlarged, not fixed, not tender and no uterine prolapse.       Adnexa:         Right: No mass, tenderness or fullness.          Left: No " mass, tenderness or fullness.        Rectum: No external hemorrhoid.      Comments: Urethra normal without lesions  No bladder tenderness  Musculoskeletal:         General: Normal range of motion.      Cervical back: Normal range of motion and neck supple.   Lymphadenopathy:      Lower Body: No right inguinal adenopathy. No left inguinal adenopathy.   Skin:     General: Skin is warm and dry.   Neurological:      Mental Status: She is alert and oriented to person, place, and time.   Psychiatric:         Speech: Speech normal.         Behavior: Behavior normal. Behavior is cooperative.

## 2024-02-21 PROBLEM — Z01.419 ENCOUNTER FOR ANNUAL ROUTINE GYNECOLOGICAL EXAMINATION: Status: RESOLVED | Noted: 2018-08-01 | Resolved: 2024-02-21

## 2024-02-21 PROBLEM — R09.89 CHRONIC THROAT CLEARING: Status: RESOLVED | Noted: 2020-01-10 | Resolved: 2024-02-21

## 2024-02-23 ENCOUNTER — OFFICE VISIT (OUTPATIENT)
Dept: GYNECOLOGY | Facility: CLINIC | Age: 64
End: 2024-02-23
Payer: COMMERCIAL

## 2024-02-23 VITALS
SYSTOLIC BLOOD PRESSURE: 100 MMHG | DIASTOLIC BLOOD PRESSURE: 70 MMHG | HEIGHT: 63 IN | WEIGHT: 137.8 LBS | BODY MASS INDEX: 24.41 KG/M2 | HEART RATE: 78 BPM

## 2024-02-23 DIAGNOSIS — N95.2 VAGINAL ATROPHY: ICD-10-CM

## 2024-02-23 DIAGNOSIS — Z12.31 SCREENING MAMMOGRAM FOR BREAST CANCER: ICD-10-CM

## 2024-02-23 DIAGNOSIS — Z01.419 ENCOUNTER FOR GYNECOLOGICAL EXAMINATION (GENERAL) (ROUTINE) WITHOUT ABNORMAL FINDINGS: Primary | ICD-10-CM

## 2024-02-23 PROCEDURE — S0612 ANNUAL GYNECOLOGICAL EXAMINA: HCPCS | Performed by: OBSTETRICS & GYNECOLOGY

## 2024-02-23 PROCEDURE — G0145 SCR C/V CYTO,THINLAYER,RESCR: HCPCS | Performed by: OBSTETRICS & GYNECOLOGY

## 2024-02-23 PROCEDURE — G0476 HPV COMBO ASSAY CA SCREEN: HCPCS | Performed by: OBSTETRICS & GYNECOLOGY

## 2024-02-23 RX ORDER — ESTRADIOL 0.1 MG/G
0.5 CREAM VAGINAL 2 TIMES WEEKLY
Qty: 42.5 G | Refills: 2 | Status: SHIPPED | OUTPATIENT
Start: 2024-02-26

## 2024-02-29 ENCOUNTER — CONSULT (OUTPATIENT)
Dept: CARDIOLOGY CLINIC | Facility: CLINIC | Age: 64
End: 2024-02-29
Payer: COMMERCIAL

## 2024-02-29 VITALS
DIASTOLIC BLOOD PRESSURE: 78 MMHG | HEART RATE: 75 BPM | WEIGHT: 139 LBS | HEIGHT: 64 IN | BODY MASS INDEX: 23.73 KG/M2 | SYSTOLIC BLOOD PRESSURE: 136 MMHG

## 2024-02-29 DIAGNOSIS — I34.1 MVP (MITRAL VALVE PROLAPSE): ICD-10-CM

## 2024-02-29 DIAGNOSIS — E78.00 ELEVATED LDL CHOLESTEROL LEVEL: ICD-10-CM

## 2024-02-29 DIAGNOSIS — R94.39 ABNORMAL NUCLEAR STRESS TEST: Primary | ICD-10-CM

## 2024-02-29 DIAGNOSIS — R07.89 CHEST PRESSURE: ICD-10-CM

## 2024-02-29 PROCEDURE — 99244 OFF/OP CNSLTJ NEW/EST MOD 40: CPT | Performed by: INTERNAL MEDICINE

## 2024-02-29 NOTE — PROGRESS NOTES
Cardiology Consultation     Dee Luis  [id number]  1960  [facility] [place] [id number] [address]TI[address]  [address] [id number] [phone]    1. Abnormal nuclear stress test  CTA cardiac    Echo complete w/ contrast if indicated      2. Elevated LDL cholesterol level  Ambulatory Referral to Cardiology      3. Chest pressure  Ambulatory Referral to Cardiology    CTA cardiac      4. MVP (mitral valve prolapse)  Echo complete w/ contrast if indicated          Discussion/Summary:    Chest pressure and an abnormal stress nuclear study - Samanta's chest pressure is atypical for angina as it is random and not associated with exertion.  Etiology to this is most likely to either be GI related or referred pain from her on and off chronic back pain.  However given the symptoms and what appears to be an equivocal stress test, normal perfusion with ECG changes I did recommend a definitive test since this continues to keep happening.  Do feel that her ECG changes are a false positive as she has baseline nonspecific changes and they resolved back to her baseline rather quickly in recovery.  However I did recommend a more definitive test, a cardiac CTA.  We could use this to visualize the coronaries but also get a calcium score.  We will call her with the results.    History of mitral valve prolapse - Samanta does not appear to have this as she had been told many years ago she had a due to palpitations, which was common in the past.  She had an echocardiogram in 2019 that showed bowing of the anterior leaflet but no prolapse.  For completeness I did order an updated echocardiogram today.  If her testing is unremarkable she only needs to see us back if needed.      HPI:    Mrs. Luis comes in today for consultation to discussed on and off symptoms of chest heaviness along with an abnormal stress nuclear study that she had  performed last month.  Samanta has no specific cardiac history and no risk factors for cardiovascular disease at all.  She carries a history for many years of mitral valve prolapse, I believe simply because she had palpitations when she was younger as mitral valve prolapse was in many cases misdiagnosed in the past.  She did have an echocardiogram in 2019 that showed bowing of the anterior leaflet of the mitral valve but no prolapse.  She does have a very mildly elevated LDL that does not require treatment.  Her 10-year ASCVD risk is 2.8%.    Samanta has had on and off chest heaviness for many years.  From what I gather she had symptoms like she is experiencing now back in 2009, but was also having significant lightheadedness.  This led to a cardiac catheterization which was normal.  Through the years she has had on and off random chest heaviness that again in 2019 led to cardiology consultation and testing.  At that time she had an exercise treadmill test which was equivocal for ischemia as she had baseline ST and T wave changes.  This led to a stress nuclear study which showed no perfusion defects, but she still had these subtle ECG changes that did not meet ischemic criteria.  After 2019 Samanta then became asymptomatic and was doing well.    Then over the last 6 months or so Samanta started noticing symptoms again.  These were very similar to the symptoms she had in the past.  She describes a random chest heaviness that is not associated with exertion.  It is across her chest but she will also feel pain in between her shoulder blades.  She has had issues with her back due to overuse for years and there is many times she has this back pain without chest pain.  She does not have any symptoms with exertion.  She does exercise on a regular basis and can do this without limitations or symptoms.  Her chest pain does not radiate.  There is no associated symptoms like shortness of breath, nausea, diaphoresis or  lightheadedness.  No recent episodes of palpitations or any syncope.  Because of the symptoms her internist ordered another stress nuclear study which was somewhat similar to the one back in 2019.  Her perfusion was normal and she went 13.4 METS with no chest pain/heaviness.  She did have ECG changes but again these were difficult to interpret given her baseline ST changes.      Patient Active Problem List   Diagnosis    Spider veins of both lower extremities    Strain of left hip    External hemorrhoids    Metatarsalgia    Motion sickness    Multiple joint pain    Post-menopausal atrophic vaginitis    Vitamin B12 deficiency    Vitamin D deficiency    Chest pain    Dizziness    MVP (mitral valve prolapse)     Past Medical History:   Diagnosis Date    BRCA1 negative     done thru [facility] web site    BRCA2 negative     Endometriosis     Hemorrhoids     Mitral valve prolapse     MVP (mitral valve prolapse)      Social History     Socioeconomic History    Marital status: /Civil Union     Spouse name: Not on file    Number of children: Not on file    Years of education: Not on file    Highest education level: Not on file   Occupational History    Not on file   Tobacco Use    Smoking status: Never    Smokeless tobacco: Never   Vaping Use    Vaping status: Never Used   Substance and Sexual Activity    Alcohol use: Yes     Alcohol/week: 3.0 standard drinks of alcohol     Types: 3 Glasses of wine per week    Drug use: No    Sexual activity: Yes     Partners: Male     Birth control/protection: Post-menopausal   Other Topics Concern    Not on file   Social History Narrative    Wears seatbelt    Regular dental care     Regular exercise      Social Determinants of Health     Financial Resource Strain: Not on file   Food Insecurity: Not on file   Transportation Needs: Not on file   Physical Activity: Not on file   Stress: Not on file   Social Connections: Not on file   Intimate Partner Violence: Not on file   Housing  Stability: Not on file      Family History   Problem Relation Age of Onset    Melanoma Mother     Hypertension Mother     Skin cancer Mother         Malignant melanoma in eye- 30's, second time malignant melanoma in lungs-72    Colon cancer Father 60    Pulmonary fibrosis Father     Breast cancer Sister 63    No Known Problems Sister     Stroke Daughter     No Known Problems Daughter     No Known Problems Maternal Grandmother     No Known Problems Maternal Grandfather     No Known Problems Paternal Grandmother     No Known Problems Paternal Grandfather     Cancer Son         Lymphoma-age 6    Hodgkin's lymphoma Son 6        Non hodgkins lymphoma    No Known Problems Maternal Aunt     Breast cancer Maternal Aunt 80    No Known Problems Maternal Aunt     No Known Problems Paternal Aunt     No Known Problems Paternal Aunt      Past Surgical History:   Procedure Laterality Date    CARDIAC CATHETERIZATION      COLONOSCOPY  07/02/2019    GANGLION CYST EXCISION Right     wrist    OOPHORECTOMY Right 2003    Age 42?    DC HEMORRHOIDECTOMY INT & XTRNL 2/> COLUMN/DONTRELL N/A 01/10/2017    Procedure: INTERNAL AND EXTERNAL HEMORRHOIDECTOMY ;  Surgeon: Shruthi Car MD;  Location: [facility];  Service: Colorectal       Current Outpatient Medications:     albuterol (Ventolin HFA) 90 mcg/act inhaler, Inhale 2 puffs every 6 (six) hours as needed for wheezing, Disp: 18 g, Rfl: 5    Cholecalciferol (VITAMIN D3 GUMMIES ADULT PO), Take by mouth, Disp: , Rfl:     estradiol (ESTRACE) 0.1 mg/g vaginal cream, Insert 0.5 g into the vagina 2 (two) times a week, Disp: 42.5 g, Rfl: 2    methocarbamol (Robaxin-750) 750 mg tablet, Take 1 tablet (750 mg total) by mouth every 6 (six) hours as needed for muscle spasms, Disp: 30 tablet, Rfl: 2    Multiple Vitamin (MULTIVITAMIN) tablet, Take 1 tablet by mouth daily, Disp: , Rfl:     Multiple Vitamins-Minerals (EMERGEN-C IMMUNE PO), Take by mouth, Disp: , Rfl:     Multiple Vitamins-Minerals  "(HAIR/SKIN/NAILS) TABS, Take by mouth daily, Disp: , Rfl:     pantoprazole (PROTONIX) 40 mg tablet, take 1 tablet by mouth once daily before breakfast, Disp: 30 tablet, Rfl: 1    tretinoin (RETIN-A) 0.025 % cream, Apply topically daily at bedtime, Disp: 45 g, Rfl: 2  No Known Allergies  Vitals:    02/29/24 0817   BP: 136/78   BP Location: Left arm   Patient Position: Sitting   Cuff Size: Standard   Pulse: 75   Weight: 63 kg (139 lb)   Height: 5' 4\" (1.626 m)       Labs:  Lab Results   Component Value Date    K 3.7 01/09/2024    K 3.9 01/10/2019     01/09/2024     01/10/2019    CO2 27 01/09/2024    CO2 29 01/10/2019    BUN 16 01/09/2024    BUN 19 01/10/2019    CREATININE 0.64 01/09/2024    CALCIUM 9.2 01/09/2024    CALCIUM 9.4 01/10/2019     Lab Results   Component Value Date    WBC 4.48 01/09/2024    HGB 13.0 01/09/2024    HCT 40.8 01/09/2024    MCV 91 01/09/2024     01/09/2024     Lab Results   Component Value Date    TRIG 92 01/09/2024    TRIG 55 11/09/2019    HDL 60 01/09/2024    HDL 62 11/09/2019     Imaging: Recent ECGs reviewed shows sinus rhythm with nonspecific ST changes.    XR chest pa & lateral  Result Date: 2/13/2024  Narrative: CHEST INDICATION:   Other chest pain. Pain in thoracic spine. COMPARISON:  Comparison made with previous examination(s) dated (NM) 22-Jan-2024,(DX) 11-Mar-2021,(RF) 20-Jan-2020,(CT) 27-Dec-2019,(NM) 12-Nov-2019. EXAM PERFORMED/VIEWS:  XR CHEST PA & LATERAL FINDINGS: Cardiomediastinal silhouette appears unremarkable. The lungs are clear.  No pneumothorax or pleural effusion. Osseous structures appear within normal limits for patient age.     Impression: No acute cardiopulmonary disease. Electronically signed: 02/13/2024 12:24 PM Lavon Mora MD      STRESS NUCLEAR (1/22/024):    Stress ECG: A Allan protocol stress test was performed. The patient reached stage 4.0of the protocol after exercising for 12 min and 0 sec and had a maximal HR of 176 bpm (110% of " MPHR) and 13.4 METS. The patient experienced no angina during the test. The patient requested the test to be stopped due to fatigue. Symptoms ended during recovery.    Stress ECG: Horizontal ST depression of 1.0 mm in the inferior and inferolateral leads (II, III, aVF, V5 and V6) is noted. Arrhythmias during recovery: rare PVCs. The ECG was positive for ischemia. The stress ECG is consistent with ischemia after maximal exercise, without reproduction of symptoms.    Perfusion: There are no perfusion defects.    Stress Function: Left ventricular function post-stress is normal. Stress ejection fraction is >70%.    Stress Combined Conclusion: The ECG and SPECT imaging portions of the stress study are discordant. Left ventricular perfusion is normal.     Discordant exercise stress ECG / SPECT  There is ECG evidence of ischemia at maximal exercise (ST depressions)  SPECT perfusion shows no stress-induced perfusion defects  Good exercise capacity (13.4 METS) without chest pain  Final impression -- normal study with false-positive stress ECG. Low risk for stress-induced myocardial ischemia.       ECHO (10/30/2019):  LEFT VENTRICLE:  Systolic function was normal by visual assessment. Ejection fraction was estimated in the range of 60 % to 65 %.  There were no regional wall motion abnormalities.  Doppler parameters were consistent with abnormal left ventricular relaxation (grade 1 diastolic dysfunction).     MITRAL VALVE:  There was systolic bowing of the anterior leaflet, but without diagnostic evidence for prolapse.         Review of Systems:  Review of Systems   Constitutional: Negative.    HENT: Negative.     Eyes: Negative.    Respiratory: Negative.     Cardiovascular:  Positive for chest pain.   Gastrointestinal: Negative.    Musculoskeletal: Negative.    Skin: Negative.    Allergic/Immunologic: Negative.    Neurological: Negative.    Hematological: Negative.    Psychiatric/Behavioral: Negative.     All other systems  "reviewed and are negative.    Vitals:    02/29/24 0817   BP: 136/78   BP Location: Left arm   Patient Position: Sitting   Cuff Size: Standard   Pulse: 75   Weight: 63 kg (139 lb)   Height: 5' 4\" (1.626 m)      Physical Exam  Vitals and nursing note reviewed.   Constitutional:       Appearance: She is well-developed.   HENT:      Head: Normocephalic and atraumatic.   Eyes:      General: No scleral icterus.        Right eye: No discharge.         Left eye: No discharge.      Pupils: Pupils are equal, round, and reactive to light.   Neck:      Thyroid: No thyromegaly.      Vascular: No JVD.   Cardiovascular:      Rate and Rhythm: Normal rate and regular rhythm. No extrasystoles are present.     Pulses: Normal pulses. No decreased pulses.      Heart sounds: Normal heart sounds, S1 normal and S2 normal. No murmur heard.     No friction rub. No gallop.   Pulmonary:      Effort: Pulmonary effort is normal. No respiratory distress.      Breath sounds: Normal breath sounds. No wheezing, rhonchi or rales.   Abdominal:      General: Bowel sounds are normal. There is no distension.      Palpations: Abdomen is soft.      Tenderness: There is no abdominal tenderness.   Musculoskeletal:         General: No tenderness or deformity. Normal range of motion.      Cervical back: Normal range of motion and neck supple.   Skin:     General: Skin is warm and dry.      Findings: No rash.   Neurological:      Mental Status: She is alert and oriented to person, place, and time.      Cranial Nerves: No cranial nerve deficit.   Psychiatric:         Thought Content: Thought content normal.         Judgment: Judgment normal.       Counseling / Coordination of Care  Total office time spent today 45 minutes.  Greater than 50% of total time was spent with the patient and / or family counseling and / or coordination of care.    "

## 2024-03-01 LAB
LAB AP GYN PRIMARY INTERPRETATION: NORMAL
Lab: NORMAL

## 2024-03-27 DIAGNOSIS — R00.0 TACHYCARDIA, UNSPECIFIED: Primary | ICD-10-CM

## 2024-03-27 NOTE — TELEPHONE ENCOUNTER
[facility] - Radiology    Requests order for beta blocker for cardiac CTA scheduled for 4/15/24 please.

## 2024-03-28 DIAGNOSIS — R94.39 ABNORMAL NUCLEAR STRESS TEST: Primary | ICD-10-CM

## 2024-03-28 RX ORDER — METOPROLOL TARTRATE 50 MG/1
50 TABLET, FILM COATED ORAL ONCE
Qty: 1 TABLET | Refills: 0 | Status: SHIPPED | OUTPATIENT
Start: 2024-03-28 | End: 2024-03-28

## 2024-03-28 NOTE — TELEPHONE ENCOUNTER
Per Dr. Gonzalez -   I initially read the message wrong and actually ordered the cardiac CTA again in error.  You could cancel that order I just placed.  It is fine for her to have a beta-blocker prior to the CT and I would use 50 mg once.  I get variable requests for this, as sometimes they just give it so I am not sure for spouse to send this to her pharmacy or they just give it from there med cart after the CT.  Regardless that is what she would take prior to the cardiac CTA.  Thank you.

## 2024-04-18 ENCOUNTER — HOSPITAL ENCOUNTER (OUTPATIENT)
Dept: NON INVASIVE DIAGNOSTICS | Facility: HOSPITAL | Age: 64
Discharge: HOME | End: 2024-04-18
Attending: INTERNAL MEDICINE
Payer: COMMERCIAL

## 2024-04-18 ENCOUNTER — HOSPITAL ENCOUNTER (OUTPATIENT)
Dept: CT IMAGING | Facility: HOSPITAL | Age: 64
Discharge: HOME | End: 2024-04-18
Attending: INTERNAL MEDICINE
Payer: COMMERCIAL

## 2024-04-18 VITALS — SYSTOLIC BLOOD PRESSURE: 161 MMHG | DIASTOLIC BLOOD PRESSURE: 69 MMHG | HEART RATE: 72 BPM | RESPIRATION RATE: 20 BRPM

## 2024-04-18 VITALS
BODY MASS INDEX: 23.73 KG/M2 | HEIGHT: 64 IN | SYSTOLIC BLOOD PRESSURE: 136 MMHG | HEART RATE: 64 BPM | WEIGHT: 139 LBS | DIASTOLIC BLOOD PRESSURE: 78 MMHG

## 2024-04-18 DIAGNOSIS — I34.1 MVP (MITRAL VALVE PROLAPSE): ICD-10-CM

## 2024-04-18 DIAGNOSIS — R94.39 ABNORMAL NUCLEAR STRESS TEST: ICD-10-CM

## 2024-04-18 DIAGNOSIS — R07.89 CHEST PRESSURE: ICD-10-CM

## 2024-04-18 PROCEDURE — 93306 TTE W/DOPPLER COMPLETE: CPT | Performed by: INTERNAL MEDICINE

## 2024-04-18 PROCEDURE — 75574 CT ANGIO HRT W/3D IMAGE: CPT

## 2024-04-18 PROCEDURE — 93306 TTE W/DOPPLER COMPLETE: CPT

## 2024-04-18 RX ORDER — METOPROLOL TARTRATE 1 MG/ML
5 INJECTION, SOLUTION INTRAVENOUS
Status: DISCONTINUED | OUTPATIENT
Start: 2024-04-18 | End: 2024-04-19 | Stop reason: HOSPADM

## 2024-04-18 RX ORDER — NITROGLYCERIN 0.4 MG/1
0.4 TABLET SUBLINGUAL ONCE
Status: COMPLETED | OUTPATIENT
Start: 2024-04-18 | End: 2024-04-18

## 2024-04-18 RX ADMIN — IOHEXOL 160 ML: 350 INJECTION, SOLUTION INTRAVENOUS at 16:00

## 2024-04-18 RX ADMIN — NITROGLYCERIN 0.4 MG: 0.4 TABLET SUBLINGUAL at 15:48

## 2024-04-18 NOTE — NURSING NOTE
Patient arrived for cardiac CT angiography. Test education reviewed with patient. Medications and allergies verified. Patient took po metoprolol tartrate 50 mg as instructed by cardiology. SL nitro given per protocol. See vitals flowsheet. Tolerated test well. Instructed to increase fluid intake remainder of day. Vitals stable, patient asymptomatic upon departure.

## 2024-04-19 LAB
AORTIC ROOT: 2.8 CM
AORTIC VALVE MEAN VELOCITY: 7.9 M/S
APICAL FOUR CHAMBER EJECTION FRACTION: 57 %
AV LVOT MEAN GRADIENT: 2 MMHG
AV MEAN GRADIENT: 3 MMHG
BSA FOR ECHO PROCEDURE: 1.68 M2
DOP CALC AO VTI: 26.5 CM
DOP CALC LVOT PEAK VEL VTI: 22.1 CM
DOP CALC MV VTI: 24.9 CM
E WAVE DECELERATION TIME: 188 MS
E/A RATIO: 1.2
FRACTIONAL SHORTENING: 31 (ref 28–44)
INTERVENTRICULAR SEPTUM IN DIASTOLE (PARASTERNAL SHORT AXIS VIEW): 0.7 CM
INTERVENTRICULAR SEPTUM: 0.7 CM (ref 0.6–1.1)
IVC: 23 MM
LA/AORTA RATIO 2D: 1.14
LAAS-AP2: 12.3 CM2
LAAS-AP4: 12 CM2
LEFT ATRIUM SIZE: 3.2 CM
LEFT ATRIUM VOLUME (MOD BIPLANE): 29 ML
LEFT ATRIUM VOLUME INDEX (MOD BIPLANE): 17.3 ML/M2
LEFT INTERNAL DIMENSION IN SYSTOLE: 3.1 CM (ref 2.1–4)
LEFT VENTRICLE DIASTOLIC VOLUME (MOD BIPLANE): 83 ML
LEFT VENTRICLE DIASTOLIC VOLUME INDEX (MOD BIPLANE): 49.4 ML/M2
LEFT VENTRICLE SYSTOLIC VOLUME (MOD BIPLANE): 36 ML
LEFT VENTRICLE SYSTOLIC VOLUME INDEX (MOD BIPLANE): 21.4 ML/M2
LEFT VENTRICULAR INTERNAL DIMENSION IN DIASTOLE: 4.5 CM (ref 3.5–6)
LEFT VENTRICULAR POSTERIOR WALL IN END DIASTOLE: 0.6 CM
LEFT VENTRICULAR STROKE VOLUME: 55 ML
LV EF: 57 %
LVSV (TEICH): 55 ML
MV E'TISSUE VEL-LAT: 11 CM/S
MV E'TISSUE VEL-SEP: 8 CM/S
MV MEAN GRADIENT: 1 MMHG
MV PEAK A VEL: 0.75 M/S
MV PEAK E VEL: 90 CM/S
MV PEAK GRADIENT: 3 MMHG
MV STENOSIS PRESSURE HALF TIME: 55 MS
MV VALVE AREA P 1/2 METHOD: 4
RA PRESSURE ESTIMATED: 8 MMHG
RIGHT VENTRICLE ID DIMENSION: 3.5 CM
RV PSP: 30 MMHG
SL CV LV EF: 57
SL CV PED ECHO LEFT VENTRICLE DIASTOLIC VOLUME (MOD BIPLANE) 2D: 94 ML
SL CV PED ECHO LEFT VENTRICLE SYSTOLIC VOLUME (MOD BIPLANE) 2D: 39 ML
TR MAX PG: 22 MMHG
TR PEAK VELOCITY: 2.3 M/S
TRICUSPID ANNULAR PLANE SYSTOLIC EXCURSION: 2.2 CM
TRICUSPID VALVE PEAK REGURGITATION VELOCITY: 2.32 M/S

## 2024-05-22 DIAGNOSIS — M53.87 SCIATICA ASSOCIATED WITH DISORDER OF LUMBOSACRAL SPINE: ICD-10-CM

## 2024-05-22 RX ORDER — METHOCARBAMOL 750 MG/1
750 TABLET, FILM COATED ORAL EVERY 6 HOURS PRN
Qty: 30 TABLET | Refills: 0 | Status: SHIPPED | OUTPATIENT
Start: 2024-05-22

## 2024-05-28 ENCOUNTER — OFFICE VISIT (OUTPATIENT)
Dept: FAMILY MEDICINE CLINIC | Facility: HOSPITAL | Age: 64
End: 2024-05-28
Payer: COMMERCIAL

## 2024-05-28 ENCOUNTER — HOSPITAL ENCOUNTER (OUTPATIENT)
Dept: RADIOLOGY | Facility: HOSPITAL | Age: 64
Discharge: HOME | End: 2024-05-28
Payer: COMMERCIAL

## 2024-05-28 ENCOUNTER — TELEPHONE (OUTPATIENT)
Dept: GASTROENTEROLOGY | Facility: CLINIC | Age: 64
End: 2024-05-28

## 2024-05-28 VITALS
TEMPERATURE: 97.8 F | HEIGHT: 64 IN | BODY MASS INDEX: 23.22 KG/M2 | DIASTOLIC BLOOD PRESSURE: 76 MMHG | OXYGEN SATURATION: 99 % | RESPIRATION RATE: 16 BRPM | HEART RATE: 82 BPM | WEIGHT: 136 LBS | SYSTOLIC BLOOD PRESSURE: 132 MMHG

## 2024-05-28 DIAGNOSIS — M54.6 ACUTE BILATERAL THORACIC BACK PAIN: ICD-10-CM

## 2024-05-28 DIAGNOSIS — M54.6 ACUTE BILATERAL THORACIC BACK PAIN: Primary | ICD-10-CM

## 2024-05-28 PROCEDURE — 99214 OFFICE O/P EST MOD 30 MIN: CPT | Performed by: INTERNAL MEDICINE

## 2024-05-28 PROCEDURE — 72080 X-RAY EXAM THORACOLMB 2/> VW: CPT

## 2024-05-28 RX ORDER — ONDANSETRON 8 MG/1
TABLET, ORALLY DISINTEGRATING ORAL
COMMUNITY
Start: 2024-05-24

## 2024-05-28 RX ORDER — TRAMADOL HYDROCHLORIDE 50 MG/1
50 TABLET ORAL 2 TIMES DAILY PRN
Qty: 12 TABLET | Refills: 0 | Status: SHIPPED | OUTPATIENT
Start: 2024-05-28

## 2024-05-28 NOTE — TELEPHONE ENCOUNTER
Scheduled date of colonoscopy (as of today): 07/06/2024  Physician performing colonoscopy: Dr. Ray   Location of colonoscopy: [facility]  Bowel prep reviewed with patient: Miralax   Instructions reviewed with patient by: Sofia   Clearances: N/A

## 2024-05-28 NOTE — PROGRESS NOTES
Assessment/Plan:    Acute bilateral thoracic back pain  Patient presents for chief complaint of midthoracic back pain that started gradually about 2 weeks ago and has been getting worse.  Has done a lot of yard work with lifting heavy items that made the discomfort worse and she was in severe pain this last weekend necessitating taking Advil with Tylenol Robaxin and 1 dose of Tramadol that she found at home.  She has also applied heating and tried massaging the painful area.    The discomfort is worse when she sits, stands and bends.  She saw physical therapy and has been doing her physical therapy exercises that also help alleviate the discomfort.    DEXA scan in 2023 showed osteopenia in the lumbar spine and x-ray of the thoracic spine in 2021 showed multilevel degenerative changes.    She denies numbness of the skin, lower extremity weakness, gait dysfunction    She had no spinous process tenderness on examination.  She had range of motion in the spine.  She had tenderness of the paraspinal mid thoracic/upper lumbar muscles.  Skin showed no subcutaneous ecchymosis, hematoma, herpes zoster lesions.  Gait was normal    Ordered x-ray of the thoracic and lumbar spine to assess for compression fracture, assess the degree of osteoarthritis.    Advised patient to continue doing her home exercises, applying heating pad, using Tylenol with Advil alternating.  For severe pain I prescribed her tramadol.  She only uses Robaxin once or twice a day.  We discussed potential interaction with tramadol causing excessive sleepiness.    I reviewed [place] PDMP     Diagnoses and all orders for this visit:    Acute bilateral thoracic back pain  -     XR spine thoracolumbar 2 vw; Future  -     traMADol (Ultram) 50 mg tablet; Take 1 tablet (50 mg total) by mouth 2 (two) times a day as needed for moderate pain    Other orders  -     ondansetron (ZOFRAN-ODT) 8 mg disintegrating tablet; dissolve 1 tablet on top of the tongue every 8 hours if  "needed for nausea and vomiting          Subjective:      Patient ID: Dee Luis is a 63 y.o. female.      HPI    Patient presents for follow-up of chronic conditions as detailed in the assessment and plan.      The following portions of the patient's history were reviewed and updated as appropriate: current medications, past family history, past medical history, past social history, past surgical history and problem list.    Review of Systems      Objective:    /76   Pulse 82   Temp 97.8 °F (36.6 °C) (Tympanic)   Resp 16   Ht 5' 4\" (1.626 m)   Wt 61.7 kg (136 lb)   LMP  (LMP Unknown)   SpO2 99%   BMI 23.34 kg/m²      Physical Exam  Constitutional:       General: She is not in acute distress.     Appearance: She is not toxic-appearing.   Musculoskeletal:         General: Tenderness present. Normal range of motion.   Skin:     General: Skin is warm and dry.      Findings: No bruising or rash.   Neurological:      Mental Status: She is alert.      Motor: No weakness.      Gait: Gait normal.             Adele Robert MD  "

## 2024-05-28 NOTE — TELEPHONE ENCOUNTER
05/28/24  Screened by: Sofia Summers    Referring Provider Dr. Ray     Pre- Screening:     There is no height or weight on file to calculate BMI.  Has patient been referred for a routine screening Colonoscopy? yes  Is the patient between 45-75 years old? yes      Previous Colonoscopy yes   If yes:    Date: 2019    Facility: Other    Reason: Screening      SCHEDULING STAFF: If the patient is between 45yrs-49yrs, please advise patient to confirm benefits/coverage with their insurance company for a routine screening colonoscopy, some insurance carriers will only cover at 50yrs or older. If the patient is over 75years old, please schedule an office visit.     Does the patient want to see a Gastroenterologist prior to their procedure OR are they having any GI symptoms? no    Has the patient been hospitalized or had abdominal surgery in the past 6 months? no    Does the patient use supplemental oxygen? no    Does the patient take Coumadin, Lovenox, Plavix, Elliquis, Xarelto, or other blood thinning medication? no    Has the patient had a stroke, cardiac event, or stent placed in the past year? no    SCHEDULING STAFF: If patient answers NO to above questions, then schedule procedure. If patient answers YES to above questions, then schedule office appointment.     If patient is between 45yrs - 49yrs, please advise patient that we will have to confirm benefits & coverage with their insurance company for a routine screening colonoscopy.

## 2024-05-28 NOTE — ASSESSMENT & PLAN NOTE
Patient presents for chief complaint of midthoracic back pain that started gradually about 2 weeks ago and has been getting worse.  Has done a lot of yard work with lifting heavy items that made the discomfort worse and she was in severe pain this last weekend necessitating taking Advil with Tylenol Robaxin and 1 dose of Tramadol that she found at home.  She has also applied heating and tried massaging the painful area.    The discomfort is worse when she sits, stands and bends.  She saw physical therapy and has been doing her physical therapy exercises that also help alleviate the discomfort.    DEXA scan in 2023 showed osteopenia in the lumbar spine and x-ray of the thoracic spine in 2021 showed multilevel degenerative changes.    She denies numbness of the skin, lower extremity weakness, gait dysfunction    She had no spinous process tenderness on examination.  She had range of motion in the spine.  She had tenderness of the paraspinal mid thoracic/upper lumbar muscles.  Skin showed no subcutaneous ecchymosis, hematoma, herpes zoster lesions.  Gait was normal    Ordered x-ray of the thoracic and lumbar spine to assess for compression fracture, assess the degree of osteoarthritis.    Advised patient to continue doing her home exercises, applying heating pad, using Tylenol with Advil alternating.  For severe pain I prescribed her tramadol.  She only uses Robaxin once or twice a day.  We discussed potential interaction with tramadol causing excessive sleepiness.    I reviewed [place]

## 2024-05-31 ENCOUNTER — TELEPHONE (OUTPATIENT)
Dept: FAMILY MEDICINE CLINIC | Facility: HOSPITAL | Age: 64
End: 2024-05-31

## 2024-06-27 ENCOUNTER — ANESTHESIA (OUTPATIENT)
Dept: ANESTHESIOLOGY | Facility: AMBULATORY SURGERY CENTER | Age: 64
End: 2024-06-27

## 2024-06-27 ENCOUNTER — ANESTHESIA EVENT (OUTPATIENT)
Dept: ANESTHESIOLOGY | Facility: AMBULATORY SURGERY CENTER | Age: 64
End: 2024-06-27

## 2024-07-11 ENCOUNTER — ANESTHESIA EVENT (OUTPATIENT)
Dept: GASTROENTEROLOGY | Facility: AMBULATORY SURGERY CENTER | Age: 64
End: 2024-07-11

## 2024-07-11 ENCOUNTER — HOSPITAL ENCOUNTER (OUTPATIENT)
Dept: GASTROENTEROLOGY | Facility: AMBULATORY SURGERY CENTER | Age: 64
Discharge: HOME | End: 2024-07-11
Payer: COMMERCIAL

## 2024-07-11 ENCOUNTER — ANESTHESIA (OUTPATIENT)
Dept: GASTROENTEROLOGY | Facility: AMBULATORY SURGERY CENTER | Age: 64
End: 2024-07-11

## 2024-07-11 VITALS
WEIGHT: 136 LBS | BODY MASS INDEX: 23.22 KG/M2 | SYSTOLIC BLOOD PRESSURE: 136 MMHG | DIASTOLIC BLOOD PRESSURE: 68 MMHG | HEIGHT: 64 IN | TEMPERATURE: 97.6 F | HEART RATE: 85 BPM | RESPIRATION RATE: 19 BRPM | OXYGEN SATURATION: 99 %

## 2024-07-11 DIAGNOSIS — Z86.010 PERSONAL HISTORY OF COLONIC POLYPS: ICD-10-CM

## 2024-07-11 PROBLEM — Z80.0 FAMILY HISTORY OF COLON CANCER IN FATHER: Status: ACTIVE | Noted: 2024-07-11

## 2024-07-11 PROCEDURE — 45380 COLONOSCOPY AND BIOPSY: CPT | Performed by: INTERNAL MEDICINE

## 2024-07-11 PROCEDURE — 88305 TISSUE EXAM BY PATHOLOGIST: CPT | Performed by: PATHOLOGY

## 2024-07-11 RX ORDER — PROPOFOL 10 MG/ML
INJECTION, EMULSION INTRAVENOUS AS NEEDED
Status: DISCONTINUED | OUTPATIENT
Start: 2024-07-11 | End: 2024-07-11

## 2024-07-11 RX ORDER — PROPOFOL 10 MG/ML
INJECTION, EMULSION INTRAVENOUS CONTINUOUS PRN
Status: DISCONTINUED | OUTPATIENT
Start: 2024-07-11 | End: 2024-07-11

## 2024-07-11 RX ORDER — SODIUM CHLORIDE, SODIUM LACTATE, POTASSIUM CHLORIDE, CALCIUM CHLORIDE 600; 310; 30; 20 MG/100ML; MG/100ML; MG/100ML; MG/100ML
50 INJECTION, SOLUTION INTRAVENOUS CONTINUOUS
Status: DISCONTINUED | OUTPATIENT
Start: 2024-07-11 | End: 2024-07-15 | Stop reason: HOSPADM

## 2024-07-11 RX ADMIN — PROPOFOL 150 MG: 10 INJECTION, EMULSION INTRAVENOUS at 07:26

## 2024-07-11 RX ADMIN — PROPOFOL 150 MCG/KG/MIN: 10 INJECTION, EMULSION INTRAVENOUS at 07:26

## 2024-07-11 RX ADMIN — SODIUM CHLORIDE, SODIUM LACTATE, POTASSIUM CHLORIDE, CALCIUM CHLORIDE 50 ML/HR: 600; 310; 30; 20 INJECTION, SOLUTION INTRAVENOUS at 07:14

## 2024-07-11 NOTE — H&P
History and Physical -  [facility]  Dee Luis 63 y.o. female MRN: [id number]    HPI: Dee Luis is a 63 y.o. year old female who presents for colonoscopy secondary to personal history of polyps, family history of colon cancer, family history of colon polyps    REVIEW OF SYSTEMS: Per the HPI, and otherwise unremarkable.    Historical Information   Past Medical History:   Diagnosis Date    BRCA1 negative     done thru [facility]    BRCA2 negative     Colon polyp     Endometriosis     Hemorrhoids     Mitral valve prolapse     MVP (mitral valve prolapse)     Reactive airway disease     takes inhaler     Past Surgical History:   Procedure Laterality Date    CARDIAC CATHETERIZATION      COLONOSCOPY  07/02/2019    GANGLION CYST EXCISION Right     wrist    OOPHORECTOMY Right 2003    Age 42?    NM HEMORRHOIDECTOMY INT & XTRNL 2/> COLUMN/DONTRELL N/A 01/10/2017    Procedure: INTERNAL AND EXTERNAL HEMORRHOIDECTOMY ;  Surgeon: Shruthi Car MD;  Location: [facility];  Service: Colorectal     Social History   Social History     Substance and Sexual Activity   Alcohol Use Yes    Alcohol/week: 3.0 standard drinks of alcohol    Types: 3 Glasses of wine per week    Comment: occasionally     Social History     Substance and Sexual Activity   Drug Use No     Social History     Tobacco Use   Smoking Status Never   Smokeless Tobacco Never     Family History   Problem Relation Age of Onset    Melanoma Mother     Hypertension Mother     Skin cancer Mother         Malignant melanoma in eye- 30's, second time malignant melanoma in lungs-72    Colon cancer Father 60    Pulmonary fibrosis Father     Breast cancer Sister 63    No Known Problems Sister     Stroke Daughter     No Known Problems Daughter     No Known Problems Maternal Grandmother     No Known Problems Maternal Grandfather     No Known Problems Paternal Grandmother     No Known Problems Paternal Grandfather     Cancer Son          Lymphoma-age 6    Hodgkin's lymphoma Son 6        Non hodgkins lymphoma    No Known Problems Maternal Aunt     Breast cancer Maternal Aunt 80    No Known Problems Maternal Aunt     No Known Problems Paternal Aunt     No Known Problems Paternal Aunt        Meds/Allergies       Current Outpatient Medications:     albuterol (Ventolin HFA) 90 mcg/act inhaler    Cholecalciferol (VITAMIN D3 GUMMIES ADULT PO)    estradiol (ESTRACE) 0.1 mg/g vaginal cream    methocarbamol (Robaxin-750) 750 mg tablet    Multiple Vitamin (MULTIVITAMIN) tablet    Multiple Vitamins-Minerals (HAIR/SKIN/NAILS) TABS    tretinoin (RETIN-A) 0.025 % cream    ALPRAZolam (XANAX) 0.5 mg tablet    ondansetron (ZOFRAN-ODT) 8 mg disintegrating tablet    pantoprazole (PROTONIX) 40 mg tablet    traMADol (Ultram) 50 mg tablet    Current Facility-Administered Medications:     lactated ringers infusion, 50 mL/hr, Intravenous, Continuous    No Known Allergies    Objective     LMP  (LMP Unknown)     PHYSICAL EXAM    Gen: NAD AAOx3  Head: Normocephalic, Atraumatic  CV: S1S2 RRR no m/r/g  CHEST: Clear b/l no c/r/w  ABD: soft, +BS NT/ND  EXT: no edema    ASSESSMENT/PLAN:  This is a 63 y.o. year old female here for colonoscopy secondary to personal history of colon polyps and family history of colon cancer, and she is stable and optimized for her procedure.

## 2024-07-11 NOTE — ANESTHESIA PREPROCEDURE EVALUATION
Procedure:  COLONOSCOPY    Relevant Problems   ANESTHESIA   (+) Motion sickness      CARDIO   (+) Acute bilateral thoracic back pain   (+) Chest pain   (+) MVP (mitral valve prolapse)      MUSCULOSKELETAL   (+) Acute bilateral thoracic back pain   (+) Strain of left hip        Physical Exam    Airway    Mallampati score: I  TM Distance: >3 FB  Neck ROM: full     Dental   No notable dental hx     Cardiovascular  Cardiovascular exam normal    Pulmonary  Pulmonary exam normal     Other Findings  post-pubertal.      Anesthesia Plan  ASA Score- 2     Anesthesia Type- IV sedation with anesthesia with ASA Monitors.         Additional Monitors:     Airway Plan:     Comment: I discussed risks (reviewed with patient on the anesthesia consent form), benefits and alternatives of monitored sedation including the possibility under sedation to have recall or mild discomfort.  .       Plan Factors-    Chart reviewed.    Patient summary reviewed.                  Induction- intravenous.    Postoperative Plan-         Informed Consent- Anesthetic plan and risks discussed with patient.  I personally reviewed this patient with the CRNA. Discussed and agreed on the Anesthesia Plan with the CRNA..

## 2024-07-11 NOTE — ANESTHESIA POSTPROCEDURE EVALUATION
Post-Op Assessment Note    CV Status:  Stable  Pain Score: 0    Pain management: adequate       Mental Status:  Alert and sleepy   Hydration Status:  Stable   PONV Controlled:  None   Airway Patency:  Patent     Post Op Vitals Reviewed: Yes    No anethesia notable event occurred.    Staff: CRNA               BP      Temp      Pulse     Resp      SpO2

## 2024-07-15 PROCEDURE — 88305 TISSUE EXAM BY PATHOLOGIST: CPT | Performed by: PATHOLOGY

## 2024-08-14 DIAGNOSIS — L57.8 SOLAR ELASTOSIS: ICD-10-CM

## 2024-08-14 DIAGNOSIS — J45.20 MILD INTERMITTENT REACTIVE AIRWAY DISEASE WITHOUT COMPLICATION: ICD-10-CM

## 2024-08-15 RX ORDER — ALBUTEROL SULFATE 90 UG/1
2 AEROSOL, METERED RESPIRATORY (INHALATION) EVERY 6 HOURS PRN
Qty: 18 G | Refills: 5 | Status: SHIPPED | OUTPATIENT
Start: 2024-08-15

## 2024-08-15 RX ORDER — TRETINOIN 0.25 MG/G
CREAM TOPICAL
Qty: 45 G | Refills: 5 | Status: SHIPPED | OUTPATIENT
Start: 2024-08-15

## 2024-11-29 DIAGNOSIS — N95.2 VAGINAL ATROPHY: ICD-10-CM

## 2024-12-02 RX ORDER — ESTRADIOL 0.1 MG/G
0.5 CREAM VAGINAL 2 TIMES WEEKLY
Qty: 42.5 G | Refills: 2 | Status: SHIPPED | OUTPATIENT
Start: 2024-12-02

## 2025-01-16 ENCOUNTER — OFFICE VISIT (OUTPATIENT)
Dept: FAMILY MEDICINE CLINIC | Facility: HOSPITAL | Age: 65
End: 2025-01-16
Payer: COMMERCIAL

## 2025-01-16 VITALS
OXYGEN SATURATION: 99 % | BODY MASS INDEX: 23.9 KG/M2 | SYSTOLIC BLOOD PRESSURE: 148 MMHG | DIASTOLIC BLOOD PRESSURE: 78 MMHG | HEIGHT: 64 IN | HEART RATE: 95 BPM | WEIGHT: 140 LBS

## 2025-01-16 DIAGNOSIS — M54.6 THORACIC SPINE PAIN: ICD-10-CM

## 2025-01-16 DIAGNOSIS — M54.6 THORACOLUMBAR BACK PAIN: ICD-10-CM

## 2025-01-16 DIAGNOSIS — M54.50 THORACOLUMBAR BACK PAIN: ICD-10-CM

## 2025-01-16 DIAGNOSIS — R79.89 HIGH SERUM LOW DENSITY LIPOPROTEIN (LDL) CHOLESTEROL: ICD-10-CM

## 2025-01-16 DIAGNOSIS — Z00.00 ANNUAL PHYSICAL EXAM: Primary | ICD-10-CM

## 2025-01-16 DIAGNOSIS — M25.50 MULTIPLE JOINT PAIN: ICD-10-CM

## 2025-01-16 PROCEDURE — 99396 PREV VISIT EST AGE 40-64: CPT | Performed by: INTERNAL MEDICINE

## 2025-01-16 NOTE — PROGRESS NOTES
Adult Annual Physical  Name: Dee Luis      : 1960      MRN: [id number]  Encounter Provider: Estella Orozco DO  Encounter Date: 2025   Encounter department: [facility] [address]    Assessment & Plan  Annual physical exam         Thoracolumbar back pain    Orders:  •  C-reactive protein; Future  •  FAVIO Screen w/Reflex Cascade; Future  •  Vitamin D 25 hydroxy; Future  •  Vitamin B12; Future    Thoracic spine pain         High serum low density lipoprotein (LDL) cholesterol    Orders:  •  Lipid Panel with Direct LDL reflex; Future    Multiple joint pain    Orders:  •  CBC and differential; Future  •  Comprehensive metabolic panel; Future  •  TSH, 3rd generation with Free T4 reflex; Future    Immunizations and preventive care screenings were discussed with patient today. Appropriate education was printed on patient's after visit summary.    Counseling:  Exercise: the importance of regular exercise/physical activity was discussed. Recommend exercise 3-5 times per week for at least 30 minutes.          History of Present Illness     Adult Annual Physical  Review of Systems   HENT:  Negative for congestion.    Cardiovascular:  Negative for chest pain and palpitations.   Gastrointestinal:  Negative for abdominal pain.   All other systems reviewed and are negative.    Current Outpatient Medications on File Prior to Visit   Medication Sig Dispense Refill   • albuterol (Ventolin HFA) 90 mcg/act inhaler Inhale 2 puffs every 6 (six) hours as needed for wheezing 18 g 5   • ALPRAZolam (XANAX) 0.5 mg tablet Take 1 tablet (0.5 mg total) by mouth 3 (three) times a day as needed for anxiety 20 tablet 0   • estradiol (ESTRACE) 0.1 mg/g vaginal cream Insert 0.5 g into the vagina 2 (two) times a week 42.5 g 2   • methocarbamol (Robaxin-750) 750 mg tablet Take 1 tablet (750 mg total) by mouth every 6 (six) hours as needed for muscle spasms 30 tablet 0   • pantoprazole (PROTONIX) 40 mg tablet take  "1 tablet by mouth once daily before breakfast 30 tablet 1   • traMADol (Ultram) 50 mg tablet Take 1 tablet (50 mg total) by mouth 2 (two) times a day as needed for moderate pain 12 tablet 0   • tretinoin (RETIN-A) 0.025 % cream Apply topically daily at bedtime 45 g 5   • Cholecalciferol (VITAMIN D3 GUMMIES ADULT PO) Take by mouth (Patient not taking: Reported on 1/16/2025)     • Multiple Vitamin (MULTIVITAMIN) tablet Take 1 tablet by mouth daily (Patient not taking: Reported on 1/16/2025)     • ondansetron (ZOFRAN-ODT) 8 mg disintegrating tablet dissolve 1 tablet on top of the tongue every 8 hours if needed for nausea and vomiting (Patient not taking: Reported on 1/16/2025)     • [DISCONTINUED] Multiple Vitamins-Minerals (HAIR/SKIN/NAILS) TABS Take by mouth daily (Patient not taking: Reported on 1/16/2025)       No current facility-administered medications on file prior to visit.      Social History     Tobacco Use   • Smoking status: Never   • Smokeless tobacco: Never   Vaping Use   • Vaping status: Never Used   Substance and Sexual Activity   • Alcohol use: Yes     Alcohol/week: 3.0 standard drinks of alcohol     Types: 3 Glasses of wine per week     Comment: occasionally   • Drug use: No   • Sexual activity: Yes     Partners: Male     Birth control/protection: Post-menopausal       Objective   /78   Pulse 95   Ht 5' 4\" (1.626 m)   Wt 63.5 kg (140 lb)   LMP  (LMP Unknown)   SpO2 99%   BMI 24.03 kg/m²     Physical Exam  Vitals and nursing note reviewed.   Constitutional:       General: She is not in acute distress.  HENT:      Head: Normocephalic.      Nose: No congestion.      Mouth/Throat:      Pharynx: No posterior oropharyngeal erythema.   Eyes:      General:         Right eye: No discharge.         Left eye: No discharge.      Pupils: Pupils are equal, round, and reactive to light.   Cardiovascular:      Rate and Rhythm: Normal rate and regular rhythm.      Heart sounds: No murmur " heard.  Pulmonary:      Breath sounds: No wheezing or rhonchi.   Abdominal:      General: There is no distension.      Tenderness: There is no abdominal tenderness.   Musculoskeletal:         General: Tenderness present. No swelling.      Cervical back: No rigidity or tenderness.      Right lower leg: No edema.      Left lower leg: No edema.      Comments: Minimal infrascapular tenderness left greater than  rigth   Lymphadenopathy:      Cervical: No cervical adenopathy.   Skin:     Findings: No erythema.   Neurological:      Mental Status: She is alert and oriented to person, place, and time.   Psychiatric:         Mood and Affect: Mood normal.         Thought Content: Thought content normal.         Judgment: Judgment normal.

## 2025-01-25 ENCOUNTER — APPOINTMENT (OUTPATIENT)
Dept: LAB | Facility: HOSPITAL | Age: 65
End: 2025-01-25
Payer: COMMERCIAL

## 2025-01-25 DIAGNOSIS — M54.50 THORACOLUMBAR BACK PAIN: ICD-10-CM

## 2025-01-25 DIAGNOSIS — M25.50 MULTIPLE JOINT PAIN: ICD-10-CM

## 2025-01-25 DIAGNOSIS — R79.89 HIGH SERUM LOW DENSITY LIPOPROTEIN (LDL) CHOLESTEROL: ICD-10-CM

## 2025-01-25 DIAGNOSIS — M54.6 THORACOLUMBAR BACK PAIN: ICD-10-CM

## 2025-01-25 LAB
25(OH)D3 SERPL-MCNC: 41.2 NG/ML (ref 30–100)
ALBUMIN SERPL BCG-MCNC: 4.7 G/DL (ref 3.5–5)
ALP SERPL-CCNC: 60 U/L (ref 34–104)
ALT SERPL W P-5'-P-CCNC: 11 U/L (ref 7–52)
ANION GAP SERPL CALCULATED.3IONS-SCNC: 7 MMOL/L (ref 4–13)
AST SERPL W P-5'-P-CCNC: 17 U/L (ref 13–39)
BASOPHILS # BLD AUTO: 0.06 THOUSANDS/ΜL (ref 0–0.1)
BASOPHILS NFR BLD AUTO: 1 % (ref 0–1)
BILIRUB SERPL-MCNC: 0.42 MG/DL (ref 0.2–1)
BUN SERPL-MCNC: 19 MG/DL (ref 5–25)
CALCIUM SERPL-MCNC: 9.2 MG/DL (ref 8.4–10.2)
CHLORIDE SERPL-SCNC: 107 MMOL/L (ref 96–108)
CHOLEST SERPL-MCNC: 217 MG/DL (ref ?–200)
CO2 SERPL-SCNC: 26 MMOL/L (ref 21–32)
CREAT SERPL-MCNC: 0.68 MG/DL (ref 0.6–1.3)
CRP SERPL QL: <1 MG/L
EOSINOPHIL # BLD AUTO: 0.12 THOUSAND/ΜL (ref 0–0.61)
EOSINOPHIL NFR BLD AUTO: 3 % (ref 0–6)
ERYTHROCYTE [DISTWIDTH] IN BLOOD BY AUTOMATED COUNT: 13.6 % (ref 11.6–15.1)
GFR SERPL CREATININE-BSD FRML MDRD: 94 ML/MIN/1.73SQ M
GLUCOSE P FAST SERPL-MCNC: 91 MG/DL (ref 65–99)
HCT VFR BLD AUTO: 35.9 % (ref 34.8–46.1)
HDLC SERPL-MCNC: 69 MG/DL
HGB BLD-MCNC: 11.4 G/DL (ref 11.5–15.4)
IMM GRANULOCYTES # BLD AUTO: 0.01 THOUSAND/UL (ref 0–0.2)
IMM GRANULOCYTES NFR BLD AUTO: 0 % (ref 0–2)
LDLC SERPL CALC-MCNC: 133 MG/DL (ref 0–100)
LYMPHOCYTES # BLD AUTO: 1.29 THOUSANDS/ΜL (ref 0.6–4.47)
LYMPHOCYTES NFR BLD AUTO: 31 % (ref 14–44)
MCH RBC QN AUTO: 27.6 PG (ref 26.8–34.3)
MCHC RBC AUTO-ENTMCNC: 31.8 G/DL (ref 31.4–37.4)
MCV RBC AUTO: 87 FL (ref 82–98)
MONOCYTES # BLD AUTO: 0.36 THOUSAND/ΜL (ref 0.17–1.22)
MONOCYTES NFR BLD AUTO: 9 % (ref 4–12)
NEUTROPHILS # BLD AUTO: 2.35 THOUSANDS/ΜL (ref 1.85–7.62)
NEUTS SEG NFR BLD AUTO: 56 % (ref 43–75)
NRBC BLD AUTO-RTO: 0 /100 WBCS
PLATELET # BLD AUTO: 291 THOUSANDS/UL (ref 149–390)
PMV BLD AUTO: 10.2 FL (ref 8.9–12.7)
POTASSIUM SERPL-SCNC: 4.4 MMOL/L (ref 3.5–5.3)
PROT SERPL-MCNC: 7.2 G/DL (ref 6.4–8.4)
RBC # BLD AUTO: 4.13 MILLION/UL (ref 3.81–5.12)
SODIUM SERPL-SCNC: 140 MMOL/L (ref 135–147)
TRIGL SERPL-MCNC: 73 MG/DL (ref ?–150)
TSH SERPL DL<=0.05 MIU/L-ACNC: 1.65 UIU/ML (ref 0.45–4.5)
VIT B12 SERPL-MCNC: 336 PG/ML (ref 180–914)
WBC # BLD AUTO: 4.19 THOUSAND/UL (ref 4.31–10.16)

## 2025-01-25 PROCEDURE — 82306 VITAMIN D 25 HYDROXY: CPT

## 2025-01-25 PROCEDURE — 84443 ASSAY THYROID STIM HORMONE: CPT

## 2025-01-25 PROCEDURE — 80061 LIPID PANEL: CPT

## 2025-01-25 PROCEDURE — 86140 C-REACTIVE PROTEIN: CPT

## 2025-01-25 PROCEDURE — 85025 COMPLETE CBC W/AUTO DIFF WBC: CPT

## 2025-01-25 PROCEDURE — 86225 DNA ANTIBODY NATIVE: CPT

## 2025-01-25 PROCEDURE — 82607 VITAMIN B-12: CPT

## 2025-01-25 PROCEDURE — 80053 COMPREHEN METABOLIC PANEL: CPT

## 2025-01-25 PROCEDURE — 86038 ANTINUCLEAR ANTIBODIES: CPT

## 2025-01-25 PROCEDURE — 36415 COLL VENOUS BLD VENIPUNCTURE: CPT

## 2025-01-26 LAB
DSDNA IGG SERPL IA-ACNC: 1.7 IU/ML (ref ?–15)
NUCLEAR IGG SER IA-RTO: 0.1 RATIO (ref ?–1)

## 2025-01-27 ENCOUNTER — RESULTS FOLLOW-UP (OUTPATIENT)
Dept: FAMILY MEDICINE CLINIC | Facility: HOSPITAL | Age: 65
End: 2025-01-27

## 2025-03-14 ENCOUNTER — CLINICAL SUPPORT (OUTPATIENT)
Dept: FAMILY MEDICINE CLINIC | Facility: HOSPITAL | Age: 65
End: 2025-03-14
Payer: COMMERCIAL

## 2025-03-14 DIAGNOSIS — Z23 ENCOUNTER FOR IMMUNIZATION: Primary | ICD-10-CM

## 2025-03-14 PROCEDURE — 90750 HZV VACC RECOMBINANT IM: CPT

## 2025-03-14 PROCEDURE — 90471 IMMUNIZATION ADMIN: CPT

## 2025-07-18 ENCOUNTER — PATIENT MESSAGE (OUTPATIENT)
Dept: GYNECOLOGY | Facility: CLINIC | Age: 65
End: 2025-07-18

## 2025-07-18 DIAGNOSIS — Z12.31 SCREENING MAMMOGRAM FOR BREAST CANCER: Primary | ICD-10-CM

## 2025-07-28 ENCOUNTER — OFFICE VISIT (OUTPATIENT)
Dept: GYNECOLOGY | Facility: CLINIC | Age: 65
End: 2025-07-28
Payer: COMMERCIAL

## 2025-07-28 VITALS
HEIGHT: 64 IN | DIASTOLIC BLOOD PRESSURE: 80 MMHG | SYSTOLIC BLOOD PRESSURE: 150 MMHG | WEIGHT: 138 LBS | BODY MASS INDEX: 23.56 KG/M2

## 2025-07-28 DIAGNOSIS — Z13.820 OSTEOPOROSIS SCREENING: ICD-10-CM

## 2025-07-28 DIAGNOSIS — Z78.0 MENOPAUSE: ICD-10-CM

## 2025-07-28 DIAGNOSIS — Z01.419 ENCOUNTER FOR GYNECOLOGICAL EXAMINATION (GENERAL) (ROUTINE) WITHOUT ABNORMAL FINDINGS: Primary | ICD-10-CM

## 2025-07-28 DIAGNOSIS — N95.2 VAGINAL ATROPHY: ICD-10-CM

## 2025-07-28 PROCEDURE — 99396 PREV VISIT EST AGE 40-64: CPT | Performed by: OBSTETRICS & GYNECOLOGY

## 2025-07-28 RX ORDER — AMOXICILLIN 250 MG
CAPSULE ORAL
COMMUNITY
Start: 2025-02-01

## 2025-07-28 RX ORDER — ESTRADIOL 0.1 MG/G
0.5 CREAM VAGINAL 2 TIMES WEEKLY
Qty: 42.5 G | Refills: 1 | Status: SHIPPED | OUTPATIENT
Start: 2025-07-28

## 2025-08-07 ENCOUNTER — HOSPITAL ENCOUNTER (OUTPATIENT)
Dept: BONE DENSITY | Facility: CLINIC | Age: 65
Discharge: HOME | End: 2025-08-07
Payer: COMMERCIAL

## 2025-08-07 VITALS — WEIGHT: 138 LBS | HEIGHT: 63 IN | BODY MASS INDEX: 24.45 KG/M2

## 2025-08-07 DIAGNOSIS — Z78.0 MENOPAUSE: ICD-10-CM

## 2025-08-07 DIAGNOSIS — Z13.820 OSTEOPOROSIS SCREENING: ICD-10-CM

## 2025-08-07 PROCEDURE — 77080 DXA BONE DENSITY AXIAL: CPT

## 2025-08-14 ENCOUNTER — HOSPITAL ENCOUNTER (OUTPATIENT)
Dept: MAMMOGRAPHY | Facility: CLINIC | Age: 65
Discharge: HOME | End: 2025-08-14
Payer: COMMERCIAL

## (undated) DEVICE — SPONGE STICK WITH PVP-I: Brand: KENDALL

## (undated) DEVICE — SUT VICRYL 5-0 RB-1 27 IN J213H

## (undated) DEVICE — GLOVE SRG BIOGEL ECLIPSE 7.5

## (undated) DEVICE — POOLE SUCTION HANDLE: Brand: CARDINAL HEALTH

## (undated) DEVICE — SUT CHROMIC 3-0 SH 27 IN G122H

## (undated) DEVICE — 3000CC GUARDIAN II: Brand: GUARDIAN

## (undated) DEVICE — BASIC SINGLE BASIN 2-LF: Brand: MEDLINE INDUSTRIES, INC.

## (undated) DEVICE — STRL UNIVERSAL MINOR GENERAL: Brand: CARDINAL HEALTH

## (undated) DEVICE — LUBRICANT SURGILUBE TUBE 4 OZ  FLIP TOP

## (undated) DEVICE — TUBING SUCTION 5MM X 12 FT